# Patient Record
Sex: FEMALE | Employment: UNEMPLOYED | URBAN - METROPOLITAN AREA
[De-identification: names, ages, dates, MRNs, and addresses within clinical notes are randomized per-mention and may not be internally consistent; named-entity substitution may affect disease eponyms.]

---

## 2017-11-16 ENCOUNTER — HOSPITAL ENCOUNTER (EMERGENCY)
Age: 37
Discharge: HOME | End: 2017-11-16
Attending: EMERGENCY MEDICINE
Payer: MEDICAID

## 2017-11-16 VITALS
HEIGHT: 56 IN | OXYGEN SATURATION: 96 % | WEIGHT: 180 LBS | SYSTOLIC BLOOD PRESSURE: 119 MMHG | BODY MASS INDEX: 40.49 KG/M2 | RESPIRATION RATE: 16 BRPM | DIASTOLIC BLOOD PRESSURE: 71 MMHG | TEMPERATURE: 98.8 F | HEART RATE: 88 BPM

## 2017-11-16 DIAGNOSIS — N30.01 ACUTE CYSTITIS WITH HEMATURIA: Primary | ICD-10-CM

## 2017-11-16 LAB
BACTERIA URNS QL MICRO: ABNORMAL /HPF
CASTS URNS QL MICRO: 0 /LPF
CRYSTALS URNS QL MICRO: 0 /LPF
EPI CELLS #/AREA URNS HPF: ABNORMAL /HPF
HCG UR QL: POSITIVE
MUCOUS THREADS URNS QL MICRO: 0 /LPF
OTHER OBSERVATIONS,UCOM: ABNORMAL
RBC #/AREA URNS HPF: ABNORMAL /HPF
WBC URNS QL MICRO: >100 /HPF

## 2017-11-16 PROCEDURE — 81015 MICROSCOPIC EXAM OF URINE: CPT | Performed by: EMERGENCY MEDICINE

## 2017-11-16 PROCEDURE — 99284 EMERGENCY DEPT VISIT MOD MDM: CPT | Performed by: EMERGENCY MEDICINE

## 2017-11-16 PROCEDURE — 81025 URINE PREGNANCY TEST: CPT

## 2017-11-16 PROCEDURE — 81003 URINALYSIS AUTO W/O SCOPE: CPT | Performed by: EMERGENCY MEDICINE

## 2017-11-16 RX ORDER — NITROFURANTOIN 25; 75 MG/1; MG/1
100 CAPSULE ORAL 2 TIMES DAILY
Qty: 14 CAP | Refills: 0 | Status: SHIPPED | OUTPATIENT
Start: 2017-11-16 | End: 2017-11-23

## 2017-11-16 RX ORDER — NITROFURANTOIN 25; 75 MG/1; MG/1
100 CAPSULE ORAL 2 TIMES DAILY
Qty: 14 CAP | Refills: 0 | Status: SHIPPED | OUTPATIENT
Start: 2017-11-16 | End: 2017-11-16

## 2017-11-16 NOTE — ED TRIAGE NOTES
Patient complaining of lower abdominal/ lower back cramping that started earlier today. Patient advises that she is about 5 weeks pregnant with 6th pregnancy and 5 live births. Patient advises having severe pain with urination and noted some blood with urination only.

## 2017-11-16 NOTE — ED NOTES
Pt's  states she needs . Unit secretary calling 3rd floor when interpretyer just went with another pt. . This pt sitting on bed in nad

## 2017-11-17 NOTE — DISCHARGE INSTRUCTIONS
Infección urinaria wilfred el embarazo: Instrucciones de cuidado - [ Urinary Tract Infection in Pregnancy: Care Instructions ]  Instrucciones de cuidado    Flako infección urinaria (UTI, por genny siglas en inglés) es flako infección de la vejiga y Arvie Rakan estructuras urinarias. La mayoría de las UTI ocurren en la vejiga. Con frecuencia, causan dolor o ardor al Vio[place]ta. La UTI es la infección bacteriana más común wilfred el [facility]. Si no se trata, flako UTI puede causar problemas arun flako infección renal o un parto prematuro. La mayoría de las UTI pueden curarse con antibióticos. Forbes médico le recetará un antibiótico que sea seguro wilfred el [facility]. Asegúrese de bety todos genny medicamentos para que la infección no se extienda a los riñones. La atención de seguimiento es flako parte clave de forbes tratamiento y seguridad. Asegúrese de hacer y acudir a todas las citas, y llame a forbes médico si está teniendo problemas. También es flako buena idea saber los resultados de los exámenes y mantener flako lista de los medicamentos que isaac. ¿Cómo puede cuidarse en el hogar? · [address] No deje de tomarlos por el hecho de sentirse mejor. Debe bety todos los antibióticos hasta terminarlos. · Wilfred los próximos yoselyn o [address], maura mayor cantidad de [place] y otros líquidos. [place] ayudará a eliminar las bacterias que están causando la infección. Si tiene [employer], el corazón o el hígado y tiene que [place] líquidos, hable con forbes médico antes de aumentar forbes consumo. · No maura alcohol. · Orine con frecuencia. Trate de vaciar la vejiga cada vez que orine. Prevención de UTI  · Maura abundantes líquidos, los suficientes para que forbes orina sea de color amarillo zoe o transparente arun el agua. [place] la ayuda a orinar con frecuencia, lo que elimina las bacterias de forbes organismo.  Si tiene [employer] [employer], el corazón o el hígado y tiene que [employer] líquidos, [place] con forbes médico antes de aumentar forbes consumo. · Orine en cuanto tenga la necesidad de hacerlo. · Orine inmediatamente después de elyse tenido [employer]. Para las mujeres, romi es la mejor manera de evitar las UTI. · Cuando vaya al baño, límpiese de adelante hacia atrás para evitar que entren bacterias a la vagina o la uretra. ¿Cuándo debe pedir ayuda? Llame a forbes médico ahora mismo o busque atención médica inmediata si:  ? · Síntomas arun fiebre, escalofríos, náuseas o vómitos aparecen por Olimpia Spearing. ? · Tiene un nuevo dolor en la espalda pily debajo de la caja torácica. [place] se llama dolor de flanco.   ? · Tiene leonidas o pus nuevos en la orina. ? · Tiene cualquier problema con genny antibióticos. ?Preste especial atención a los cambios en forbes eduarda y asegúrese de comunicarse con forbes médico si:  ? · No mejora después de 1 día (24 horas). ? · Tiene síntomas nuevos, arun [employer] ¿Dónde puede encontrar más información en inglés? Kaylah Lauren a http://merari-jose.info/. Escriba M982 en la búsqueda para aprender más acerca de \"Infección urinaria wilfred el embarazo: Instrucciones de cuidado - [ Urinary Tract Infection in Pregnancy: Care Instructions ]. \"  Revisado: 16 marzo, 2017  Versión del contenido: 11.4  © 8553-1249 Healthwise, "[employer] [employer])". Las instrucciones de cuidado fueron adaptadas bajo licencia por [employer] (which disclaims liability or warranty for this information). Si usted tiene [place] afección médica o sobre estas instrucciones, siempre pregunte a forbes profesional de eduarda. [employer] niega toda garantía o responsabilidad por forbes uso de esta información.

## 2017-11-17 NOTE — ED NOTES
Through Segundo Lynch, , I have reviewed discharge instructions with the patient. The patient verbalized understanding. Patient left ED via Discharge Method: ambulatory to Home with     Opportunity for questions and clarification provided. Patient given 1 scripts. To continue your aftercare when you leave the hospital, you may receive an automated call from our care team to check in on how you are doing. This is a free service and part of our promise to provide the best care and service to meet your aftercare needs.  If you have questions, or wish to unsubscribe from this service please call [phone]. Thank you for Choosing our [address] [place] [address] Emergency Department.

## 2017-11-17 NOTE — ED PROVIDER NOTES
HPI Comments: Patient approx 8 weeks pregnant and says she has had an US for this in the [facility] clinic. A0. Patient is a 40 y.o. female presenting with frequency. The history is provided by the patient. The history is limited by a language barrier. A  was used. Urinary Frequency    This is a new problem. The current episode started yesterday. The problem occurs every urination. The problem has not changed since onset. The quality of the pain is described as burning. The pain is moderate. There has been no fever. Associated symptoms include frequency, hematuria, urgency, abdominal pain and back pain. Pertinent negatives include no chills, no nausea, no vomiting, no flank pain and no vaginal discharge. She has tried nothing for the symptoms. Past Medical History:   Diagnosis Date    Anemia     Postpartum depression        Past Surgical History:   Procedure Laterality Date     DELIVERY ONLY      HX GYN           History reviewed. No pertinent family history. Social History     Social History    Marital status:      Spouse name: N/A    Number of children: N/A    Years of education: N/A     Occupational History    Not on file. Social History Main Topics    Smoking status: Never Smoker    Smokeless tobacco: Never Used    Alcohol use No    Drug use: No    Sexual activity: Yes     Partners: Male     Other Topics Concern    Not on file     Social History Narrative         ALLERGIES: Review of patient's allergies indicates no known allergies. Review of Systems   Constitutional: Negative for chills and fever. Gastrointestinal: Positive for abdominal pain. Negative for nausea and vomiting. Endocrine: Negative for polydipsia and polyuria. Genitourinary: Positive for dysuria, frequency, hematuria and urgency. Negative for flank pain, vaginal bleeding and vaginal discharge. Musculoskeletal: Positive for back pain.        Vitals:    17 1741 BP: 118/79   Pulse: 91   Resp: 16   Temp: 98.9 °F (37.2 °C)   SpO2: 99%   Weight: 81.6 kg (180 lb)   Height: 4' 8\" (1.422 m)            Physical Exam   Constitutional: She appears well-developed and well-nourished. No distress. HENT:   Mouth/Throat: Oropharynx is clear and moist.   Cardiovascular: Normal rate, regular rhythm and normal heart sounds. Pulmonary/Chest: Effort normal and breath sounds normal.   Abdominal: Soft. She exhibits no distension. There is tenderness (mild suprapubic tenderness). There is no rebound and no guarding. Musculoskeletal: She exhibits no edema. Neurological: She is alert. Nursing note and vitals reviewed. MDM  Number of Diagnoses or Management Options  Diagnosis management comments: Acute cystitis with hematuria. Early pregnancy. Macrobid. [facility] up with [facility] where she is seen for prenatal care in 5-7 days if not improving. Return if fever.        Amount and/or Complexity of Data Reviewed  Clinical lab tests: ordered and reviewed (Results for orders placed or performed during the hospital encounter of 11/16/17  -URINE MICROSCOPIC       Result                                            Value                         Ref Range                       WBC                                               >100                          0 /hpf                          RBC                                               20-50                         0 /hpf                          Epithelial cells                                  3-5                           0 /hpf                          Bacteria                                          4+ (H)                        0 /hpf                          Casts                                             0                             0 /lpf                          Crystals, urine                                   0                             0 /LPF                          Mucus 0                             0 /lpf                          Other observations                                RESULTS VERIFIED MANUALLY                                -HCG URINE, QL. - POC       Result                                            Value                         Ref Range                       Pregnancy test,urine (POC)                        POSITIVE (A)                  NEG                        )      ED Course       Procedures

## 2018-05-30 PROBLEM — Z34.83 PRENATAL CARE, SUBSEQUENT PREGNANCY IN THIRD TRIMESTER: Status: ACTIVE | Noted: 2018-05-30

## 2018-06-04 NOTE — PROGRESS NOTES
confirmed. Dave Bean to call patient with instructions to be NPO after midnight, arrive at 0630 , come to C entrance and check in on 4th floor.

## 2018-06-05 ENCOUNTER — ANESTHESIA (OUTPATIENT)
Dept: LABOR AND DELIVERY | Age: 38
DRG: 560 | End: 2018-06-05
Payer: MEDICAID

## 2018-06-05 ENCOUNTER — ANESTHESIA EVENT (OUTPATIENT)
Dept: LABOR AND DELIVERY | Age: 38
DRG: 560 | End: 2018-06-05
Payer: MEDICAID

## 2018-06-05 ENCOUNTER — HOSPITAL ENCOUNTER (INPATIENT)
Age: 38
LOS: 2 days | Discharge: HOME | DRG: 560 | End: 2018-06-07
Attending: OBSTETRICS & GYNECOLOGY | Admitting: OBSTETRICS & GYNECOLOGY
Payer: MEDICAID

## 2018-06-05 PROBLEM — O42.90 SPROM (PROLONGED SPONTANEOUS RUPTURE OF MEMBRANES): Status: ACTIVE | Noted: 2018-06-05

## 2018-06-05 PROBLEM — Z34.83 PRENATAL CARE, SUBSEQUENT PREGNANCY IN THIRD TRIMESTER: Status: RESOLVED | Noted: 2018-05-30 | Resolved: 2018-06-05

## 2018-06-05 PROBLEM — O42.90 SPROM (PROLONGED SPONTANEOUS RUPTURE OF MEMBRANES): Status: RESOLVED | Noted: 2018-06-05 | Resolved: 2018-06-05

## 2018-06-05 LAB
ABO + RH BLD: NORMAL
BASE DEFICIT BLDCOA-SCNC: 1.4 MMOL/L (ref 0–2)
BASE DEFICIT BLDCOV-SCNC: 2.7 MMOL/L (ref 1.9–7.7)
BDY SITE: ABNORMAL
BDY SITE: NORMAL
BLOOD GROUP ANTIBODIES SERPL: NORMAL
ERYTHROCYTE [DISTWIDTH] IN BLOOD BY AUTOMATED COUNT: 19.1 % (ref 11.9–14.6)
HCO3 BLDCOA-SCNC: 27 MMOL/L (ref 22–26)
HCO3 BLDV-SCNC: 21 MMOL/L
HCT VFR BLD AUTO: 37.7 % (ref 35.8–46.3)
HGB BLD-MCNC: 12.7 G/DL (ref 11.7–15.4)
MCH RBC QN AUTO: 29.2 PG (ref 26.1–32.9)
MCHC RBC AUTO-ENTMCNC: 33.7 G/DL (ref 31.4–35)
MCV RBC AUTO: 86.7 FL (ref 79.6–97.8)
PCO2 BLDCOA: 62 MMHG (ref 33–49)
PCO2 BLDCOV: 34 MMHG (ref 14.1–43.3)
PH BLDCOA: 7.26 [PH] (ref 7.21–7.31)
PH BLDCOV: 7.41 [PH] (ref 7.2–7.44)
PLATELET # BLD AUTO: 234 K/UL (ref 150–450)
PMV BLD AUTO: 11.3 FL (ref 10.8–14.1)
PO2 BLDCOA: 20 MMHG (ref 9–19)
PO2 BLDV: 50 MMHG (ref 30.4–57.2)
RBC # BLD AUTO: 4.35 M/UL (ref 4.05–5.25)
SERVICE CMNT-IMP: ABNORMAL
SERVICE CMNT-IMP: NORMAL
SPECIMEN EXP DATE BLD: NORMAL
WBC # BLD AUTO: 6.4 K/UL (ref 4.3–11.1)

## 2018-06-05 PROCEDURE — 74011000250 HC RX REV CODE- 250

## 2018-06-05 PROCEDURE — 74011250636 HC RX REV CODE- 250/636

## 2018-06-05 PROCEDURE — 82803 BLOOD GASES ANY COMBINATION: CPT

## 2018-06-05 PROCEDURE — 65270000029 HC RM PRIVATE

## 2018-06-05 PROCEDURE — 77030005518 HC CATH URETH FOL 2W BARD -B

## 2018-06-05 PROCEDURE — 3E033VJ INTRODUCTION OF OTHER HORMONE INTO PERIPHERAL VEIN, PERCUTANEOUS APPROACH: ICD-10-PCS | Performed by: OBSTETRICS & GYNECOLOGY

## 2018-06-05 PROCEDURE — 77030014125 HC TY EPDRL BBMI -B: Performed by: ANESTHESIOLOGY

## 2018-06-05 PROCEDURE — 85027 COMPLETE CBC AUTOMATED: CPT | Performed by: OBSTETRICS & GYNECOLOGY

## 2018-06-05 PROCEDURE — 4A1HXCZ MONITORING OF PRODUCTS OF CONCEPTION, CARDIAC RATE, EXTERNAL APPROACH: ICD-10-PCS | Performed by: OBSTETRICS & GYNECOLOGY

## 2018-06-05 PROCEDURE — 74011250636 HC RX REV CODE- 250/636: Performed by: OBSTETRICS & GYNECOLOGY

## 2018-06-05 PROCEDURE — 74011258636 HC RX REV CODE- 258/636: Performed by: OBSTETRICS & GYNECOLOGY

## 2018-06-05 PROCEDURE — 86900 BLOOD TYPING SEROLOGIC ABO: CPT | Performed by: OBSTETRICS & GYNECOLOGY

## 2018-06-05 PROCEDURE — 36415 COLL VENOUS BLD VENIPUNCTURE: CPT | Performed by: OBSTETRICS & GYNECOLOGY

## 2018-06-05 PROCEDURE — A4300 CATH IMPL VASC ACCESS PORTAL: HCPCS | Performed by: ANESTHESIOLOGY

## 2018-06-05 PROCEDURE — 77030011943

## 2018-06-05 PROCEDURE — 74011250637 HC RX REV CODE- 250/637: Performed by: OBSTETRICS & GYNECOLOGY

## 2018-06-05 PROCEDURE — 77030020254 HC SOL INJ D5LR LACTATED RINGER

## 2018-06-05 RX ORDER — FENTANYL CITRATE 50 UG/ML
INJECTION, SOLUTION INTRAMUSCULAR; INTRAVENOUS
Status: COMPLETED
Start: 2018-06-05 | End: 2018-06-05

## 2018-06-05 RX ORDER — OXYTOCIN/RINGER'S LACTATE 30/500 ML
250 PLASTIC BAG, INJECTION (ML) INTRAVENOUS
Status: COMPLETED | OUTPATIENT
Start: 2018-06-05 | End: 2018-06-05

## 2018-06-05 RX ORDER — PRENATAL VIT 96/IRON FUM/FOLIC 27MG-0.8MG
1 TABLET ORAL DAILY
Status: DISCONTINUED | OUTPATIENT
Start: 2018-06-06 | End: 2018-06-07 | Stop reason: HOSPADM

## 2018-06-05 RX ORDER — ROPIVACAINE HYDROCHLORIDE 2 MG/ML
INJECTION, SOLUTION EPIDURAL; INFILTRATION; PERINEURAL
Status: DISCONTINUED | OUTPATIENT
Start: 2018-06-05 | End: 2018-06-05 | Stop reason: HOSPADM

## 2018-06-05 RX ORDER — LIDOCAINE HYDROCHLORIDE 20 MG/ML
JELLY TOPICAL
Status: DISCONTINUED | OUTPATIENT
Start: 2018-06-05 | End: 2018-06-06 | Stop reason: HOSPADM

## 2018-06-05 RX ORDER — ACETAMINOPHEN 500 MG
1000 TABLET ORAL ONCE
Status: COMPLETED | OUTPATIENT
Start: 2018-06-05 | End: 2018-06-05

## 2018-06-05 RX ORDER — OXYTOCIN/RINGER'S LACTATE 30/500 ML
.5-2 PLASTIC BAG, INJECTION (ML) INTRAVENOUS
Status: DISCONTINUED | OUTPATIENT
Start: 2018-06-05 | End: 2018-06-06 | Stop reason: HOSPADM

## 2018-06-05 RX ORDER — DIPHENHYDRAMINE HCL 25 MG
50 CAPSULE ORAL ONCE
Status: COMPLETED | OUTPATIENT
Start: 2018-06-05 | End: 2018-06-05

## 2018-06-05 RX ORDER — SODIUM CHLORIDE 0.9 % (FLUSH) 0.9 %
5-10 SYRINGE (ML) INJECTION EVERY 8 HOURS
Status: DISCONTINUED | OUTPATIENT
Start: 2018-06-05 | End: 2018-06-06

## 2018-06-05 RX ORDER — IBUPROFEN 800 MG/1
800 TABLET ORAL
Status: DISCONTINUED | OUTPATIENT
Start: 2018-06-05 | End: 2018-06-07 | Stop reason: HOSPADM

## 2018-06-05 RX ORDER — ONDANSETRON 4 MG/1
4 TABLET, ORALLY DISINTEGRATING ORAL
Status: DISCONTINUED | OUTPATIENT
Start: 2018-06-05 | End: 2018-06-07 | Stop reason: HOSPADM

## 2018-06-05 RX ORDER — OXYTOCIN/0.9 % SODIUM CHLORIDE 15/250 ML
250 PLASTIC BAG, INJECTION (ML) INTRAVENOUS ONCE
Status: ACTIVE | OUTPATIENT
Start: 2018-06-05 | End: 2018-06-05

## 2018-06-05 RX ORDER — LIDOCAINE HYDROCHLORIDE AND EPINEPHRINE 15; 5 MG/ML; UG/ML
INJECTION, SOLUTION EPIDURAL AS NEEDED
Status: DISCONTINUED | OUTPATIENT
Start: 2018-06-05 | End: 2018-06-05 | Stop reason: HOSPADM

## 2018-06-05 RX ORDER — METHYLERGONOVINE MALEATE 0.2 MG/1
200 TABLET ORAL EVERY 4 HOURS
Status: COMPLETED | OUTPATIENT
Start: 2018-06-06 | End: 2018-06-06

## 2018-06-05 RX ORDER — LANOLIN ALCOHOL/MO/W.PET/CERES
1 CREAM (GRAM) TOPICAL
Status: DISCONTINUED | OUTPATIENT
Start: 2018-06-06 | End: 2018-06-07 | Stop reason: HOSPADM

## 2018-06-05 RX ORDER — LIDOCAINE HYDROCHLORIDE 10 MG/ML
1 INJECTION INFILTRATION; PERINEURAL
Status: DISCONTINUED | OUTPATIENT
Start: 2018-06-05 | End: 2018-06-06 | Stop reason: HOSPADM

## 2018-06-05 RX ORDER — BUTORPHANOL TARTRATE 1 MG/ML
1 INJECTION INTRAMUSCULAR; INTRAVENOUS
Status: DISCONTINUED | OUTPATIENT
Start: 2018-06-05 | End: 2018-06-06 | Stop reason: HOSPADM

## 2018-06-05 RX ORDER — BUPIVACAINE HYDROCHLORIDE 2.5 MG/ML
INJECTION, SOLUTION EPIDURAL; INFILTRATION; INTRACAUDAL AS NEEDED
Status: DISCONTINUED | OUTPATIENT
Start: 2018-06-05 | End: 2018-06-05 | Stop reason: HOSPADM

## 2018-06-05 RX ORDER — SODIUM CHLORIDE 0.9 % (FLUSH) 0.9 %
5-10 SYRINGE (ML) INJECTION AS NEEDED
Status: DISCONTINUED | OUTPATIENT
Start: 2018-06-05 | End: 2018-06-06

## 2018-06-05 RX ORDER — DIPHENHYDRAMINE HCL 25 MG
25 CAPSULE ORAL
Status: DISCONTINUED | OUTPATIENT
Start: 2018-06-05 | End: 2018-06-07 | Stop reason: HOSPADM

## 2018-06-05 RX ORDER — KETOROLAC TROMETHAMINE 30 MG/ML
30 INJECTION, SOLUTION INTRAMUSCULAR; INTRAVENOUS
Status: COMPLETED | OUTPATIENT
Start: 2018-06-05 | End: 2018-06-05

## 2018-06-05 RX ORDER — SODIUM CHLORIDE, SODIUM LACTATE, POTASSIUM CHLORIDE, CALCIUM CHLORIDE 600; 310; 30; 20 MG/100ML; MG/100ML; MG/100ML; MG/100ML
INJECTION, SOLUTION INTRAVENOUS
Status: DISCONTINUED | OUTPATIENT
Start: 2018-06-05 | End: 2018-06-05 | Stop reason: HOSPADM

## 2018-06-05 RX ORDER — METHYLERGONOVINE MALEATE 0.2 MG/ML
INJECTION INTRAVENOUS
Status: DISCONTINUED
Start: 2018-06-05 | End: 2018-06-06

## 2018-06-05 RX ORDER — DEXTROSE, SODIUM CHLORIDE, SODIUM LACTATE, POTASSIUM CHLORIDE, AND CALCIUM CHLORIDE 5; .6; .31; .03; .02 G/100ML; G/100ML; G/100ML; G/100ML; G/100ML
125 INJECTION, SOLUTION INTRAVENOUS CONTINUOUS
Status: DISCONTINUED | OUTPATIENT
Start: 2018-06-05 | End: 2018-06-06

## 2018-06-05 RX ORDER — SIMETHICONE 80 MG
80 TABLET,CHEWABLE ORAL
Status: DISCONTINUED | OUTPATIENT
Start: 2018-06-05 | End: 2018-06-07 | Stop reason: HOSPADM

## 2018-06-05 RX ORDER — METHYLERGONOVINE MALEATE 0.2 MG/ML
0.2 INJECTION INTRAVENOUS ONCE
Status: DISCONTINUED | OUTPATIENT
Start: 2018-06-05 | End: 2018-06-06

## 2018-06-05 RX ORDER — FENTANYL CITRATE 50 UG/ML
INJECTION, SOLUTION INTRAMUSCULAR; INTRAVENOUS AS NEEDED
Status: DISCONTINUED | OUTPATIENT
Start: 2018-06-05 | End: 2018-06-05 | Stop reason: HOSPADM

## 2018-06-05 RX ORDER — MINERAL OIL
120 OIL (ML) ORAL
Status: COMPLETED | OUTPATIENT
Start: 2018-06-05 | End: 2018-06-05

## 2018-06-05 RX ORDER — DOCUSATE SODIUM 100 MG/1
100 CAPSULE, LIQUID FILLED ORAL 2 TIMES DAILY
Status: DISCONTINUED | OUTPATIENT
Start: 2018-06-06 | End: 2018-06-07 | Stop reason: HOSPADM

## 2018-06-05 RX ORDER — ROPIVACAINE HYDROCHLORIDE 2 MG/ML
INJECTION, SOLUTION EPIDURAL; INFILTRATION; PERINEURAL AS NEEDED
Status: DISCONTINUED | OUTPATIENT
Start: 2018-06-05 | End: 2018-06-05 | Stop reason: HOSPADM

## 2018-06-05 RX ORDER — LIDOCAINE HYDROCHLORIDE 20 MG/ML
INJECTION, SOLUTION EPIDURAL; INFILTRATION; INTRACAUDAL; PERINEURAL AS NEEDED
Status: DISCONTINUED | OUTPATIENT
Start: 2018-06-05 | End: 2018-06-05 | Stop reason: HOSPADM

## 2018-06-05 RX ORDER — OXYTOCIN/0.9 % SODIUM CHLORIDE 15/250 ML
250 PLASTIC BAG, INJECTION (ML) INTRAVENOUS ONCE
Status: ACTIVE | OUTPATIENT
Start: 2018-06-06 | End: 2018-06-06

## 2018-06-05 RX ADMIN — ROPIVACAINE HYDROCHLORIDE 5 ML: 2 INJECTION, SOLUTION EPIDURAL; INFILTRATION; PERINEURAL at 13:45

## 2018-06-05 RX ADMIN — DIPHENHYDRAMINE HYDROCHLORIDE 50 MG: 25 CAPSULE ORAL at 20:36

## 2018-06-05 RX ADMIN — ACETAMINOPHEN 1000 MG: 500 TABLET, FILM COATED ORAL at 19:43

## 2018-06-05 RX ADMIN — SODIUM CHLORIDE, POTASSIUM CHLORIDE, SODIUM LACTATE AND CALCIUM CHLORIDE 500 ML: 600; 310; 30; 20 INJECTION, SOLUTION INTRAVENOUS at 08:20

## 2018-06-05 RX ADMIN — LIDOCAINE HYDROCHLORIDE AND EPINEPHRINE 4 ML: 15; 5 INJECTION, SOLUTION EPIDURAL at 11:02

## 2018-06-05 RX ADMIN — LIDOCAINE HYDROCHLORIDE 5 ML: 20 INJECTION, SOLUTION EPIDURAL; INFILTRATION; INTRACAUDAL; PERINEURAL at 19:12

## 2018-06-05 RX ADMIN — LIDOCAINE HYDROCHLORIDE AND EPINEPHRINE 5 ML: 15; 5 INJECTION, SOLUTION EPIDURAL at 10:14

## 2018-06-05 RX ADMIN — METHYLERGONOVINE MALEATE 0.2 MG: 0.2 INJECTION INTRAVENOUS at 21:35

## 2018-06-05 RX ADMIN — OXYTOCIN 2 MILLI-UNITS/MIN: 10 INJECTION, SOLUTION INTRAMUSCULAR; INTRAVENOUS at 08:05

## 2018-06-05 RX ADMIN — BUPIVACAINE HYDROCHLORIDE 7 ML: 2.5 INJECTION, SOLUTION EPIDURAL; INFILTRATION; INTRACAUDAL at 17:25

## 2018-06-05 RX ADMIN — KETOROLAC TROMETHAMINE 30 MG: 30 INJECTION, SOLUTION INTRAMUSCULAR at 23:19

## 2018-06-05 RX ADMIN — METHYLERGONOVINE MALEATE 0.2 MG: 0.2 INJECTION INTRAMUSCULAR; INTRAVENOUS at 21:35

## 2018-06-05 RX ADMIN — ROPIVACAINE HYDROCHLORIDE 5 ML: 2 INJECTION, SOLUTION EPIDURAL; INFILTRATION; PERINEURAL at 12:05

## 2018-06-05 RX ADMIN — SODIUM CHLORIDE, SODIUM LACTATE, POTASSIUM CHLORIDE, CALCIUM CHLORIDE, AND DEXTROSE MONOHYDRATE 125 ML/HR: 600; 310; 30; 20; 5 INJECTION, SOLUTION INTRAVENOUS at 07:50

## 2018-06-05 RX ADMIN — FENTANYL CITRATE 100 MCG: 50 INJECTION, SOLUTION INTRAMUSCULAR; INTRAVENOUS at 14:29

## 2018-06-05 RX ADMIN — BUPIVACAINE HYDROCHLORIDE 7 ML: 2.5 INJECTION, SOLUTION EPIDURAL; INFILTRATION; INTRACAUDAL at 15:10

## 2018-06-05 RX ADMIN — MINERAL OIL 120 ML: 471.95 OIL ORAL at 21:20

## 2018-06-05 RX ADMIN — ROPIVACAINE HYDROCHLORIDE 9 ML/HR: 2 INJECTION, SOLUTION EPIDURAL; INFILTRATION; PERINEURAL at 10:24

## 2018-06-05 RX ADMIN — SODIUM CHLORIDE, SODIUM LACTATE, POTASSIUM CHLORIDE, CALCIUM CHLORIDE: 600; 310; 30; 20 INJECTION, SOLUTION INTRAVENOUS at 10:03

## 2018-06-05 RX ADMIN — OXYTOCIN 10 MILLI-UNITS/MIN: 10 INJECTION, SOLUTION INTRAMUSCULAR; INTRAVENOUS at 10:00

## 2018-06-05 RX ADMIN — SODIUM CHLORIDE, SODIUM LACTATE, POTASSIUM CHLORIDE, CALCIUM CHLORIDE, AND DEXTROSE MONOHYDRATE 125 ML/HR: 600; 310; 30; 20; 5 INJECTION, SOLUTION INTRAVENOUS at 15:16

## 2018-06-05 RX ADMIN — LIDOCAINE HYDROCHLORIDE 4 ML: 20 INJECTION, SOLUTION EPIDURAL; INFILTRATION; INTRACAUDAL; PERINEURAL at 15:20

## 2018-06-05 RX ADMIN — LIDOCAINE HYDROCHLORIDE 5 ML: 20 INJECTION, SOLUTION EPIDURAL; INFILTRATION; INTRACAUDAL; PERINEURAL at 17:50

## 2018-06-05 RX ADMIN — OXYTOCIN 15000 MILLI-UNITS/HR: 10 INJECTION, SOLUTION INTRAMUSCULAR; INTRAVENOUS at 22:30

## 2018-06-05 RX ADMIN — LIDOCAINE HYDROCHLORIDE 6 ML: 20 INJECTION, SOLUTION EPIDURAL; INFILTRATION; INTRACAUDAL; PERINEURAL at 10:19

## 2018-06-05 NOTE — LACTATION NOTE
Discussed plan of care with patient. Discussed pt's choice of infant feeding method. Feeding options explored, including the importance of exclusive breastfeeding. Pt informed of our breastfeeding support system from from nursing staff and lactation staff during inpatient stay and following discharge. Questions and concerns addressed at this time. Pt confirms breastfeeding   is her decision at this time. Keyon Casey

## 2018-06-05 NOTE — PROGRESS NOTES
Dr Wang Brewster at bedside , SVE 8cm , Repositioned onto right side on peanut.    Pt continues to have pain in left lower quadrant of abd past use of epidural bolus option will notify CRNA

## 2018-06-05 NOTE — PROGRESS NOTES
06/05/18 1615   Cervical Exam   Dilation (cm) 8   Eff 80 %   Station -2   Vaginal exam done by? Padmini Edwards RN    Presentation Cephalic   Pt positioned onto right side on peanut.

## 2018-06-05 NOTE — PROGRESS NOTES
Dr Matt Landry notified of pts dilation , and variable decelerations, instructed to stop Pitocin and he will be here in 30 min

## 2018-06-05 NOTE — ANESTHESIA PREPROCEDURE EVALUATION
Anesthetic History   No history of anesthetic complications            Review of Systems / Medical History  Patient summary reviewed, nursing notes reviewed and pertinent labs reviewed    Pulmonary  Within defined limits                 Neuro/Psych         Psychiatric history     Cardiovascular  Within defined limits                Exercise tolerance: >4 METS     GI/Hepatic/Renal  Within defined limits              Endo/Other        Morbid obesity     Other Findings   Comments:            Physical Exam    Airway  Mallampati: II           Cardiovascular  Regular rate and rhythm,  S1 and S2 normal,  no murmur, click, rub, or gallop             Dental  No notable dental hx       Pulmonary  Breath sounds clear to auscultation               Abdominal         Other Findings            Anesthetic Plan    ASA: 3  Anesthesia type: epidural            Anesthetic plan and risks discussed with: Patient and Spouse      , Ml William, present and utilized

## 2018-06-05 NOTE — H&P
History & Physical    Name: Cale Preciado MRN: [id number]  SSN: [id number]    YOB: 1980  Age: 40 y.o. Sex: female      Subjective:     Estimated Date of Delivery: 18  OB History    Para Term  AB Living   6 5 5   5   SAB TAB Ectopic Molar Multiple Live Births       0 5      # Outcome Date GA Lbr Mushtaq/2nd Weight Sex Delivery Anes PTL Lv   6 Current            5 Term 16 40w0d  8 lb 13.3 oz (4.005 kg) F Vag-Spont EPIDURAL AN N HA   4 Term      Vag-Spont   HA   3 Term      Vag-Spont   HA   2 Term      Vag-Spont   HA   1 Term      Vag-Spont   HA          Ms. Shanice Bosch is admitted with pregnancy at 39w1d for induction of labor due to spontaneous rupture of membranes. Prenatal course was normal.  Please see prenatal records for details. Past Medical History:   Diagnosis Date    Anemia     Postpartum depression      Past Surgical History:   Procedure Laterality Date     DELIVERY ONLY      HX GYN       Social History     Occupational History    Not on file. Social History Main Topics    Smoking status: Never Smoker    Smokeless tobacco: Never Used    Alcohol use No    Drug use: No    Sexual activity: Yes     Partners: Male     No family history on file. No Known Allergies  Prior to Admission medications    Medication Sig Start Date End Date Taking? Authorizing Provider   PNV No12-Iron-FA-DSS-OM-3 29 mg iron-1 mg -50 mg CPKD Take  by mouth. Yes Historical Provider   ferrous sulfate (IRON) 325 mg (65 mg iron) EC tablet Take 1 Tab by mouth two (2) times a day. 3/27/18  Yes Autumn Self MD        Review of Systems: Pertinent items are noted in the History of Present Illness. Objective:     Vitals:  Vitals:    18 0652 18 0715   BP: 137/76    Pulse: 80    Resp:  16   Temp:  97.9 °F (36.6 °C)        Physical Exam:  Patient without distress.   Heart: Regular rate and rhythm  Lung: clear to auscultation throughout lung fields, no wheezes, no rales, no rhonchi and normal respiratory effort  Back: costovertebral angle tenderness absent  Abdomen: soft, nontender  Fundus: soft and non tender  Perineum: blood absent, amniotic fluid present  Cervical Exam: 2 cm dilated    Lower Extremities:  - Edema 2+   - No evidence of DVT seen on physical exam.  Membranes:  Spontaneous Rupture of Membranes; Amniotic Fluid: clear fluid        Prenatal Labs:   Lab Results   Component Value Date/Time    ABO/Rh(D) O POSITIVE 08/28/2016 07:35 AM    Rubella, External Immune (2.25) 12/04/2017    HBsAg, External Negative 12/04/2017    HIV, External Negative 12/04/2017    RPR, External Negative 12/04/2017    Gonorrhea, External Negative 12/04/2017    Chlamydia, External Negative 12/04/2017    ABO,Rh O Positive 12/04/2017    GrBStrep, External Negative 05/09/2018       Impression/Plan:     Principal Problem:    SPROM (prolonged spontaneous rupture of membranes) (6/5/2018)    Active Problems:    39 weeks gestation of pregnancy (8/28/2016)         Plan: Admit for induction of labor. Group B Strep negative.     Signed By:  Tien Powell MD     June 5, 2018

## 2018-06-05 NOTE — PROGRESS NOTES
06/05/18 1445   Urinary Catheter 06/05/18 Chaudhry   Placement Date/Time: 06/05/18 1445   Inserted By: Tangela Vargas RN   Type: Chaudhry  Catheter Size: 16 FR  Balloon Size: 10 ml  Insertion Procedure Practices: Order written;Pre-connected closed drainage system;Properly inflated balloon;Securement device used. ..    Indications for Use Acute urinary retention/bladder outlet obstruction   Status Draining;Patent   Site Condition No abnormalities   Chaudhry Catheter Care Completed Yes   Drainage Tube Clipped to Bed Yes   Catheter Secured to Thigh Yes   Tamper Seal Intact Yes   Bag Below Bladder/Not on Floor Yes   Lack of Dependent Loop in Tubing Yes   Drainage Bag Less Than Half Full Yes   Sterile Solution Used for  Irrigation N/A   Pt tolerated well,

## 2018-06-05 NOTE — PROGRESS NOTES
Labor Progress Note  Patient seen, fetal heart rate and contraction pattern evaluated, patient examined. Pt c/o pain with contractions. Appears comfortable, quiet. Physical Exam:  Cervical Exam:  4 cm dilated    60% effaced    -2 station    Presenting Part: cephalic  Membranes:  Intact    Fetal Heart Rate: Reactive    Vertex confirmed by ultrasound. Bladder emptied. arom for copious clear fluid. Assessment/Plan:  Reassuring fetal status, Labor  Progressing normally  Head feels asynclitic by exam. Vertex again confirmed by ultrasound. After arom 6/70/-2  Epidural re-dosed.  Pitocin at 14

## 2018-06-05 NOTE — PROGRESS NOTES
Explained to pt via  that epidural would need to be replaced pt verbalized understanding and consent.   Epidural pump stopped per Dr Hui Certain verbal order  IVB of LR started to get pt ready for epidural replacement

## 2018-06-05 NOTE — IP AVS SNAPSHOT
[id number] 
[phone] Patient: [facility] MRN: [id number] DYD: About your hospitalization You were admitted on:  2018 You last received care in the:  [address] [facility] You were discharged on:  2018 Why you were hospitalized Your primary diagnosis was:   (Vaginal Birth After ) Your diagnoses also included:  39 Weeks Gestation Of Pregnancy, Sprom (Prolonged Spontaneous Rupture Of Membranes), Previous  Section Complicating Pregnancy Follow-up Information Follow up With Details Comments Contact Info Gatito Amin MD On 2018 at 11:30 am [address] [place] [address]  [address] 
[phone] Your Scheduled Appointments  11:30 AM EDT Global Post Op with Gatito Amin MD  
[facility]) [address]  [address]  
[phone] Discharge Orders None A check lynne indicates which time of day the medication should be taken. My Medications START taking these medications Instructions Each Dose to Equal  
 Morning Noon Evening Bedtime  
 ibuprofen 800 mg tablet Commonly known as:  MOTRIN Your last dose was: Your next dose is: Take 1 Tab by mouth every six (6) hours as needed. 800 mg CONTINUE taking these medications Instructions Each Dose to Equal  
 Morning Noon Evening Bedtime  
 ferrous sulfate 325 mg (65 mg iron) EC tablet Commonly known as:  IRON Your last dose was: Your next dose is: Take 1 Tab by mouth two (2) times a day. 325 mg  
    
   
   
   
  
 PNV No12-Iron-FA-DSS-OM-3 29 mg iron-1 mg -50 mg Cpkd Your last dose was: Your next dose is: Take  by mouth. Where to Get Your Medications These medications were sent to [address], [address] AT [facility] [address], [address] Phone:  [phone]  
  ibuprofen 800 mg tablet Discharge Instructions Vaginal Childbirth: What To Expect At Home Your Recovery: Your body will slowly heal in the next few weeks. It is easy to get too tired and overwhelmed during the first weeks after your baby is born. Changes in your hormones can shift your mood without warning. You may find it hard to meet the extra demands on your energy and time. Take it easy on yourself. Follow-up care is a key part of your treatment and safety. Be sure to make and go to all appointments, and call your doctor if you are having problems. It's also a good idea to know your test results and keep a list of the medicines you take. How can you care for yourself at home? Vaginal bleeding and cramps · After delivery, you will have a bloody discharge from the vagina. This will turn pink within a week and then white or yellow after about 10 days. It may last for 2 to 4 weeks or longer, until the uterus has healed. Use pads instead of tampons until you stop bleeding. · Do not worry if you pass some blood clots, as long as they are smaller than a golf ball. If you have a tear or stitches in your vaginal area, change the pad at least every 4 hours to prevent soreness and infection. · You may have cramps for the first few days after childbirth. These are normal and occur as the uterus shrinks to normal size. Take an over-the-counter pain medicine, such as acetaminophen (Tylenol), ibuprofen (Advil, Motrin), or naproxen (Aleve), for cramps. Read and follow all instructions on the label. Do not take aspirin, because it can cause more bleeding.   Do not take acetaminophen (Tylenol) and other acetaminophen containing medications (i.e. Percocet) at the same time. Breast fullness · Your breasts may overfill (engorge) in the first few days after delivery. To help milk flow and to relieve pain, warm your breasts in the shower or by using warm, moist towels before nursing. · If you are not nursing, do not put warmth on your breasts or touch your breasts. Wear a tight bra or sports bra and use ice until the fullness goes away. This usually takes 2 to 3 days. · Put ice or a cold pack on your breast after nursing to reduce swelling and pain. Put a thin cloth between the ice and your skin. Activity · Eat a balanced diet. Do not try to lose weight by cutting calories. Keep taking your prenatal vitamins, or take a multivitamin. · Get as much rest as you can. Try to take naps when your baby sleeps during the day. · Get some exercise every day. But do not do any heavy exercise until your doctor says it is okay. · Wait until you are healed (about 4 to 6 weeks) before you have sexual intercourse. Your doctor will tell you when it is okay to have sex. · Talk to your doctor about birth control. You can get pregnant even before your period returns. Also, you can get pregnant while you are breast-feeding. Mental Health · Many women get the \"baby blues\" during the first few days after childbirth. You may lose sleep, feel irritable, and cry easily. You may feel happy one minute and sad the next. Hormone changes are one cause of these emotional changes. Also, the demands of a new baby, along with visits from relatives or other family needs, add to a mother's stress. The \"baby blues\" often peak around the fourth day. Then they ease up in less than 2 weeks. · If your moodiness or anxiety lasts for more than 2 weeks, or if you feel like life is not worth living, you may have postpartum depression. This is different for each mother.  Some mothers with serious depression may worry intensely about their infant's well-being. Others may feel distant from their child. Some mothers might even feel that they might harm their baby. A mother may have signs of paranoia, wondering if someone is watching her. · With all the changes in your life, you may not know if you are depressed. Pregnancy sometimes causes changes in how you feel that are similar to the symptoms of depression. · Symptoms of depression include: · Feeling sad or hopeless and losing interest in daily activities. These are the most common symptoms of depression. · Sleeping too much or not enough. · Feeling tired. You may feel as if you have no energy. · Eating too much or too little. · POSTPARTUM SUPPORT INTERNATIONAL (PSI) offers a Warm line; Chat with the Expert phone sessions; Information and Articles about Pregnancy and Postpartum Mood Disorders; Comprehensive List of Free Support Groups; Knowledgeable local coordinators who will offer support, information, and resources; Guide to Resources on JamLegend; Calendar of events in the  mood disorders community; Latest News and Research; and [facility] [address] for [employer]. Remember - You are not alone; You are not to blame; With help, you will be well. 3-349-126-PPD(2435). WWW. POSTPARTUM. NET · Writing or talking about death, such as writing suicide notes or talking about guns, knives, or pills. Keep the numbers for these national suicide hotlines: 0-138-976-TALK (7-834.709.6270) and 2-259-LVYRJJR (4-349.244.6060). If you or someone you know talks about suicide or feeling hopeless, get help right away. Constipation and Hemorrhoids · Drink plenty of fluids, enough so that your urine is light yellow or clear like water. If you have kidney, heart, or liver disease and have to limit fluids, talk with your doctor before you increase the amount of fluids you drink. · Eat plenty of fiber each day.  Have a bran muffin or bran cereal for breakfast, and try eating a piece of fruit for a mid-afternoon snack. · For painful, itchy hemorrhoids, put ice or a cold pack on the area several times a day for 10 minutes at a time. Follow this by putting a warm compress on the area for another 10 to 20 minutes or by sitting in a shallow, warm bath. When should you call for help? Call 911 anytime you think you may need emergency care. For example, call if: 
· You are thinking of hurting yourself, your baby, or anyone else. · You passed out (lost consciousness). · You have symptoms of a blood clot in your lung (called a pulmonary embolism). These may include:   
· Sudden chest pain. · Trouble breathing. · Coughing up blood. Call your doctor now or seek immediate medical care if: 
· You have severe vaginal bleeding. · You are soaking through a pad each hour for 2 or more hours. · Your vaginal bleeding seems to be getting heavier or is still bright red 4 days after delivery. · You are dizzy or lightheaded, or you feel like you may faint. · You are vomiting or cannot keep fluids down. · You have a fever. · You have new or more belly pain. · You pass tissue (not just blood). · Your vaginal discharge smells bad. · Your belly feels tender or full and hard. · Your breasts are continuously painful or red. · You feel sad, anxious, or hopeless for more than a few days. · You have sudden, severe pain in your belly. · You have symptoms of a blood clot in your leg (called a deep vein thrombosis),  
       such as: 
· Pain in your calf, back of the knee, thigh, or groin. · Redness and swelling in your leg or groin. · You have symptoms of preeclampsia, such as: 
· Sudden swelling of your face, hands, or feet. · New vision problems (such as dimness or blurring). · A severe headache. · Your blood pressure is higher than it should be or rises suddenly. · You have new nausea or vomiting. Watch closely for changes in your health, and be sure to contact your doctor if you have any problems. Parto vaginal: Instrucciones de cuidado - [ Vaginal Childbirth: Care Instructions ] Instrucciones de cuidado Forbes cuerpo sanará poco a poco wilfred las Revstr. Wilfred las primeras semanas después del nacimiento de forbes bebé, es fácil cansarse mucho o sentirse [place]. Los Revstr hormonas pueden afectar forbes estado de ánimo sin previo aviso. Podría descubrir que es difícil satisfacer las exigencias adicionales de energía y [place]. No sharfia demasiados esfuerzos. La atención de seguimiento es flako parte clave de tu tratamiento y seguridad. Asegúrate de hacer y acudir a todas las citas, y llama a tu médico si estás teniendo problemas. También es flako buena idea saber los resultados de los exámenes y mantener flako lista de los medicamentos que giuliano. Cómo puede cuidarse en el hogar? · Sangrado vaginal y cólicos (retortijones) ¨ Después del parto, tendrá flujo sanguinolento (con leonidas) de la vagina. Yin adquirirá un color rosáceo en el plazo de flako semana y luego blancuzco o amarillento después de 10 lexy. El flujo podría durar [place] 2 y 3 [facility] o más, hasta que el útero haya sanado. Use toallas sanitarias en vez de tampones hasta que deje de sangrar. ¨ No se preocupe si elimina algunos coágulos de [place], siempre y cuando nancy de tamaño inferior al de flako pelota de golf. Si tiene un desgarro o puntos de sutura en la gee vaginal, cámbiese la toalla sanitaria por lo menos cada 4 horas para prevenir sensibilidad e infección. ¨ Podría sentir cólicos [facility] [employer] primeros días después del [place]. [place] es normal y ocurren a medida que el [place] se reduce para volver a forbes tamaño normal. Aquadale un analgésico (medicamento para el dolor) de venta pepe, arun acetaminofén (Tylenol), ibuprofeno (Advil, Motrin) o naproxeno (Aleve) para los cólicos.  Eug[place] y siga todas las instrucciones de la etiqueta. No tome aspirina porque puede causar Conseco. ¨ No tome dos o más analgésicos al mismo tiempo a menos que el médico se lo haya indicado. Muchos analgésicos contienen acetaminofén, es decir, Tylenol. El exceso de acetaminofén (Tylenol) puede ser dañino. · Puntos de sutura ¨ Si tiene puntos de sutura, se disolverán por sí solos y no será necesario quitarlos. Siga las instrucciones de forbes médico para limpiar la gee de los puntos. ¨ Aplíquese hielo o flako compresa fría en la gee adolorida por entre 10 y 21 minutos cada vez, varias veces al día wilfred los primeros días. Póngase un paño luna entre el hielo y la piel. ¨ [place] gamaliel de asiento en un poco de agua tibia 3 veces al día y después de evacuar el intestino. El agua tibia ayuda con el dolor y la comezón. Si no tiene [place], flako ducha tibia puede ayudar. · Senos llenos ¨ Kira senos se pueden llenar en exceso (congestionar) wilfred los primeros días después del [place]. Para ayudar a que la [employer] y aliviar el dolor, caliéntese los senos en la ducha o usando toallas húmedas y calientes antes de amamantar. ¨ Si no está amamantando, no se coloque calor [employer] toque. Use un sostén ajustado o yoselyn deportivo, y aplíquese hielo hasta que los senos no estén llenos. [place] suele tardar Renaee [place] 2 y 4 lexy. ¨ Aplíquese hielo o flako compresa fría en el seno después de amamantar para reducir la hinchazón y el dolor. Póngase un paño luna entre el hielo y la piel. · Actividad ¨ Siga flako dieta equilibrada. No intente bajar de peso reduciendo [employer]. Continúe tomando kira vitaminas prenatales o tome un multivitamínico. 
¨ Descanse todo lo que pueda. Trate de bety siestas cuando forbes bebé duerma wilfred el día. ¨ Sharifa algo de ejercicio todos los días. Sully no sharifa ejercicio intenso hasta que forbes médico lo apruebe.  
¨ Espere hasta que haya sanado (alrededor de 4 a 6 semanas) para tener relaciones sexuales. Forbes médico le dirá cuándo puede hacerlo. ¨ Hable con forbes médico acerca de opciones de métodos anticonceptivos. Puede quedar embarazada aun antes de que forbes período menstrual regrese. Igualmente, puede quedar [address] · Neva mental 
¨ Es normal sentir algo de tristeza, ansiedad, falta de sueño y cambios en el estado de ánimo después de regresar al hogar. Si se siente malhumorada o desesperanzada wilfred más de unos pocos días, o tiene problemas para hacer las cosas que necesita hacer, hable con forbes médico. 
· Hemorroides y estreñimiento ¨ Maura abundantes líquidos, los suficientes arun para que forbes orina sea de color [place] zoe o transparente arun el agua. Si tiene flako enfermedad renal, cardíaca o hepática y tiene que Hiram's líquidos, hable con forbes médico antes de aumentar forbes consumo. ¨ Coma abundante cantidad de [employer]. Coma un panecillo (\"muffin\") de salvado o cereal de salvado en el desayuno, y trate de comer flako fruta arun refrigerio a media tarde. [address] hemorroides dolorosas y con comezón, colóquese hielo o flako compresa fría en la gee varias veces al día wilfred 10 minutos por sesión. A continuación, póngase flako compresa tibia en la gee por otros 10 a 20 minutos, o siéntese en un baño tibio poco profundo. Cuándo debe pedir ayuda? Llame al 911 en cualquier momento que considere que necesita atención de [place]. Por ejemplo, llame si: 
? · Está pensando en hacerse daño a sí misma, o en hacerle daño a forbes bebé o a otra persona. ? · Tiene dolor repentino e intenso en el abdomen. ? · Se desmayó (perdió el conocimiento). ?Llame a forbes médico ahora mismo o busque atención médica inmediata si: 
? · Tiene sangrado vaginal intenso. ? · Empapa flako toalla sanitaria cada hora, wilfred 2 o más horas. ? · El sangrado vaginal parece intensificarse o sigue siendo bobby vivo 4 días después del parto. ? · Se siente mareada o aturdida, o siente que se va a desmayar. ? · Está vomitando o no puede mantener líquidos en el estómago. ? · Tiene fiebre. ? · Tiene dolor nuevo o más intenso en el abdomen. ? · Elimina tejidos (no solo leonidas). ? · El flujo vaginal huele mal.  
? · Forbes abdomen se siente sensible, o lleno y reena. ? · Kira senos están siempre adoloridos o enrojecidos. ? · Se siente doug, ansiosa o desesperanzada [address] de unos días. ?Preste especial atención a los cambios en forbes eduarda y asegúrese de comunicarse con forbes médico si tiene algún problema. Dónde puede encontrar más información en inglés? Cynda Boast a [email or web address] Sylvia Porter G310 en la búsqueda para aprender más acerca de \"Parto vaginal: Instrucciones de cuidado - [ Vaginal Childbirth: Care Instructions ]. \" 
Revisado: 16 marzo, 2017 Versión del contenido: 11.4 © 5971-4621 Healthwise, Incorporated. Las instrucciones de cuidado fueron adaptadas bajo licencia por Good Help Connections (which disclaims liability or warranty for this information). Si usted tiene [place] afección médica o sobre estas instrucciones, siempre pregunte a forbes profesional de eduarda. Healthwise, Incorporated niega toda garantía o responsabilidad por forbes uso de esta información. AmVac Announcement We are excited to announce that we are making your provider's discharge notes available to you in AmVac. You will see these notes when they are completed and signed by the physician that discharged you from your recent hospital stay. If you have any questions or concerns about any information you see in AmVac, please call the Health Information Department where you were seen or reach out to your Primary Care Provider for more information about your plan of care. Introducing [facility]! Estimado Cuttyhunk : 
Meng por solicitar flako cuenta de MyChart usted.  Nuestros registros indican que usted ya tiene flako cuenta [facility]. Usted puede acceder a forbes cuenta en cualquier momento en [email or web address] Sabía usted que usted puede acceder a forbes hospital y las instrucciones de eugene ER en cualquier momento en MyChart ? También puede revisar [facility] de las pruebas de forbes hospitalización o visita a urgencias . Información Adicional 
 
Si tiene alguna pregunta , por favor visite la sección de preguntas frecuentes del sitio web MyChart en [email or web address] . Recuerde, Kerrie[place]view [place] NO es que se utilizará para las necesidades urgentes. Para emergencias médicas , llame al 911 . Ahora disponible en forbes iPhone y Android ! Introducing Eliu Sarmiento As a [employer] [facility] patient, I wanted to make you aware of our electronic visit tool called Eliu Sarmiento. GaleSangart 24/7 allows you to connect within minutes with a medical provider 24 hours a day, seven days a week via a mobile device or tablet or logging into a secure website from your computer. You can access Eliu Sarmiento from anywhere in the [place]. A virtual visit might be right for you when you have a simple condition and feel like you just dont want to get out of bed, or cant get away from work for an appointment, when your regular [facility] [place] [employer] [facility] provider is not available (evenings, weekends or holidays), or when youre out of town and need minor care. Electronic visits cost only $49 and if the [employer] provider determines a prescription is needed to treat your condition, one can be electronically transmitted to a nearby pharmacy*. Please take a moment to enroll today if you have not already done so. The enrollment process is free and takes just a few minutes. To enroll, please download the [place] [employer] pierre to your tablet or phone, or visit [email or web address] to enroll on your computer. And, as an [address] [place] [address] patient with a [employer] account, the results of your visits will be scanned into your electronic medical record and your primary care provider will be able to view the scanned results. We urge you to continue to see your regular Lizz Merritt provider for your ongoing medical care. And while your primary care provider may not be the one available when you seek a Eliu Chowfin virtual visit, the peace of mind you get from getting a real diagnosis real time can be priceless. For more information on Eliu Chowfin, view our Frequently Asked Questions (FAQs) at [email or web address]. Sincerely, 
 
Sagrario Torres MD 
Chief Medical Officer [facility] Lisa Gallagher *:  certain medications cannot be prescribed via Eliu Geraldofin Providers Seen During Your Hospitalization Provider Specialty Primary office phone Melba Jane MD Obstetrics & Gynecology [phone] Your Primary Care Physician (PCP) Primary Care Physician Office Phone Office Fax NONE ** None ** ** None ** You are allergic to the following No active allergies Recent Documentation Breastfeeding? OB Status Smoking Status Yes Recent pregnancy Never Smoker Emergency Contacts Name Discharge Info Relation Home Work Mobile Jerry Batista  Other Relative [6] [phone] Patient Belongings The following personal items are in your possession at time of discharge: 
  Dental Appliances: None  Visual Aid: None      Home Medications: None   Jewelry: None  Clothing: At bedside    Other Valuables: None  Personal Items Sent to Safe: none Please provide this summary of care documentation to your next provider. Signatures-by signing, you are acknowledging that this After Visit Summary has been reviewed with you and you have received a copy.   
  
 
  
    
    
 Patient Signature: ____________________________________________________________ Date:  ____________________________________________________________  
  
[place] Provider Signature:  ____________________________________________________________ Date:  ____________________________________________________________  
  
  
   
  
[address] [place] [address] [facility] 
[phone] Patient: [facility] MRN: [id number] YPB: Sobre forbes hospitalización Le admitieron el:  2018 Forbes tratamiento más reciente fue el:  SFE 4 MOTHER INFANT Le dieron de eugene el:  2018 Por qué le ingresaron Forbes diagnosis primaria es:   (Vaginal Birth After ) Forbes diagnosis también incluye:  39 Weeks Gestation Of Pregnancy, Sprom (Prolonged Spontaneous Rupture Of Membranes), Previous  Section Complicating Pregnancy Follow-up Information Follow up With Details Comments Contact Info Milo Sunshine MD On 2018 at 11:30 am [address] [place] [address]  [address] 
[phone] Your Scheduled Appointments  11:30 AM EDT Global Post Op with Milo Sunshine MD  
[facility]) [address]  [address]  
[phone] Discharge Orders [employer] A check lynne indicates which time of day the medication should be taken. My Medications EMPIECE a Kerlink Instructions Each Dose to Equal  
 Morning Noon Evening Bedtime  
 ibuprofen 800 mg tablet También conocido arun:  MOTRIN Your last dose was: Your next dose is: Take 1 Tab by mouth every six (6) hours as needed. 800 mg SIGA tomando estos medicamentos Instructions Each Dose to Equal  
 Morning Noon Evening Bedtime  
 ferrous sulfate 325 mg (65 mg iron) EC tablet También conocido arun:  IRON  
   
 Your last dose was: Your next dose is: Take 1 Tab by mouth two (2) times a day. 325 mg  
    
   
   
   
  
 PNV No12-Iron-FA-DSS-OM-3 29 mg iron-1 mg -50 mg Cpkd Your last dose was: Your next dose is: Take  by mouth. Dónde puede recoger genny medicamentos Estos medicamentos fueron enviados a [facility] [address]  [place], [address] AT [facility] [address] [address] Teléfono:  [phone]  
  ibuprofen 800 mg tablet Instrucciones a esther de eugene Vaginal Childbirth: What To Expect At Home Your Recovery: Your body will slowly heal in the next few weeks. It is easy to get too tired and overwhelmed during the first weeks after your baby is born. Changes in your hormones can shift your mood without warning. You may find it hard to meet the extra demands on your energy and time. Take it easy on yourself. Follow-up care is a key part of your treatment and safety. Be sure to make and go to all appointments, and call your doctor if you are having problems. It's also a good idea to know your test results and keep a list of the medicines you take. How can you care for yourself at home? Vaginal bleeding and cramps · After delivery, you will have a bloody discharge from the vagina. This will turn pink within a week and then white or yellow after about 10 days. It may last for 2 to 4 weeks or longer, until the uterus has healed. Use pads instead of tampons until you stop bleeding. · Do not worry if you pass some blood clots, as long as they are smaller than a golf ball. If you have a tear or stitches in your vaginal area, change the pad at least every 4 hours to prevent soreness and infection. · You may have cramps for the first few days after childbirth. These are normal and occur as the uterus shrinks to normal size.  Take an over-the-counter pain medicine, such as acetaminophen (Tylenol), ibuprofen (Advil, Motrin), or naproxen (Aleve), for cramps. Read and follow all instructions on the label. Do not take aspirin, because it can cause more bleeding. Do not take acetaminophen (Tylenol) and other acetaminophen containing medications (i.e. Percocet) at the same time. Breast fullness · Your breasts may overfill (engorge) in the first few days after delivery. To help milk flow and to relieve pain, warm your breasts in the shower or by using warm, moist towels before nursing. · If you are not nursing, do not put warmth on your breasts or touch your breasts. Wear a tight bra or sports bra and use ice until the fullness goes away. This usually takes 2 to 3 days. · Put ice or a cold pack on your breast after nursing to reduce swelling and pain. Put a thin cloth between the ice and your skin. Activity · Eat a balanced diet. Do not try to lose weight by cutting calories. Keep taking your prenatal vitamins, or take a multivitamin. · Get as much rest as you can. Try to take naps when your baby sleeps during the day. · Get some exercise every day. But do not do any heavy exercise until your doctor says it is okay. · Wait until you are healed (about 4 to 6 weeks) before you have sexual intercourse. Your doctor will tell you when it is okay to have sex. · Talk to your doctor about birth control. You can get pregnant even before your period returns. Also, you can get pregnant while you are breast-feeding. Mental Health · Many women get the \"baby blues\" during the first few days after childbirth. You may lose sleep, feel irritable, and cry easily. You may feel happy one minute and sad the next. Hormone changes are one cause of these emotional changes. Also, the demands of a new baby, along with visits from relatives or other family needs, add to a mother's stress.  The \"baby blues\" often peak around the fourth day. Then they ease up in less than 2 weeks. · If your moodiness or anxiety lasts for more than 2 weeks, or if you feel like life is not worth living, you may have postpartum depression. This is different for each mother. Some mothers with serious depression may worry intensely about their infant's well-being. Others may feel distant from their child. Some mothers might even feel that they might harm their baby. A mother may have signs of paranoia, wondering if someone is watching her. · With all the changes in your life, you may not know if you are depressed. Pregnancy sometimes causes changes in how you feel that are similar to the symptoms of depression. · Symptoms of depression include: · Feeling sad or hopeless and losing interest in daily activities. These are the most common symptoms of depression. · Sleeping too much or not enough. · Feeling tired. You may feel as if you have no energy. · Eating too much or too little. · POSTPARTUM SUPPORT INTERNATIONAL (PSI) offers a Warm line; Chat with the Expert phone sessions; Information and Articles about Pregnancy and Postpartum Mood Disorders; Comprehensive List of Free Support Groups; Knowledgeable local coordinators who will offer support, information, and resources; Guide to Resources on BTI Payments; Calendar of events in the  mood disorders community; Latest News and Research; and [facility] [address] for [employer]. Remember - You are not alone; You are not to blame; With help, you will be well. 1-060-635-PPD(0953). WWW. POSTPARTUM. NET · Writing or talking about death, such as writing suicide notes or talking about guns, knives, or pills. Keep the numbers for these national suicide hotlines: 1-708-660-TALK (0-122.716.4168) and 3-672-EVIJHOI (2-841.503.1579). If you or someone you know talks about suicide or feeling hopeless, get help right away. Constipation and Hemorrhoids · Drink plenty of fluids, enough so that your urine is light yellow or clear like water. If you have kidney, heart, or liver disease and have to limit fluids, talk with your doctor before you increase the amount of fluids you drink. · Eat plenty of fiber each day. Have a bran muffin or bran cereal for breakfast, and try eating a piece of fruit for a mid-afternoon snack. · For painful, itchy hemorrhoids, put ice or a cold pack on the area several times a day for 10 minutes at a time. Follow this by putting a warm compress on the area for another 10 to 20 minutes or by sitting in a shallow, warm bath. When should you call for help? Call 911 anytime you think you may need emergency care. For example, call if: 
· You are thinking of hurting yourself, your baby, or anyone else. · You passed out (lost consciousness). · You have symptoms of a blood clot in your lung (called a pulmonary embolism). These may include:   
· Sudden chest pain. · Trouble breathing. · Coughing up blood. Call your doctor now or seek immediate medical care if: 
· You have severe vaginal bleeding. · You are soaking through a pad each hour for 2 or more hours. · Your vaginal bleeding seems to be getting heavier or is still bright red 4 days after delivery. · You are dizzy or lightheaded, or you feel like you may faint. · You are vomiting or cannot keep fluids down. · You have a fever. · You have new or more belly pain. · You pass tissue (not just blood). · Your vaginal discharge smells bad. · Your belly feels tender or full and hard. · Your breasts are continuously painful or red. · You feel sad, anxious, or hopeless for more than a few days. · You have sudden, severe pain in your belly. · You have symptoms of a blood clot in your leg (called a deep vein thrombosis),  
       such as: 
· Pain in your calf, back of the knee, thigh, or groin. · Redness and swelling in your leg or groin. · You have symptoms of preeclampsia, such as: 
· Sudden swelling of your face, hands, or feet. · New vision problems (such as dimness or blurring). · A severe headache. · Your blood pressure is higher than it should be or rises suddenly. · You have new nausea or vomiting. Watch closely for changes in your health, and be sure to contact your doctor if you have any problems. Parto vaginal: Instrucciones de cuidado - [ Vaginal Childbirth: Care Instructions ] Instrucciones de cuidado Forbes cuerpo sanará poco a poco wilfred las [place]. Wilfred las primeras semanas después del nacimiento de forbes bebé, es fácil cansarse mucho o sentirse [place]. Los [employer] hormonas pueden afectar forbes estado de ánimo sin previo aviso. Podría descubrir que es difícil satisfacer las exigencias adicionales de energía y [place]. No sharifa demasiados esfuerzos. La atención de seguimiento es flako parte clave de tu tratamiento y seguridad. Asegúrate de hacer y acudir a todas las citas, y llama a tu médico si estás teniendo problemas. También es flako buena idea saber los resultados de los exámenes y mantener flako lista de los medicamentos que giuliano. Cómo puede cuidarse en el hogar? · Sangrado vaginal y cólicos (retortijones) ¨ Después del parto, tendrá flujo sanguinolento (con leonidas) de la vagina. Yin adquirirá un color rosáceo en el plazo de flako semana y luego blancuzco o amarillento después de 10 lexy. El flujo podría durar [place] 2 y 3 [address] o más, hasta que el útero haya sanado. Use toallas sanitarias en vez de tampones hasta que deje de sangrar. ¨ No se preocupe si elimina algunos coágulos de [place], siempre y cuando nancy de tamaño inferior al de flako pelota de golf. Si tiene un desgarro o puntos de sutura en la gee vaginal, cámbiese la toalla sanitaria por lo menos cada 4 horas para prevenir sensibilidad e infección. ¨ Podría sentir [facility] [employer] primeros días después del [place].  [place] es normal y ocurren a medida que el [place] se reduce para volver a forbes tamaño normal. [place] un analgésico (medicamento para el dolor) de venta pepe, arun acetaminofén (Tylenol), ibuprofeno (Advil, Motrin) o naproxeno (Aleve) para los cólicos. Eugenia y siga todas las instrucciones de la [place]. No tome aspirina porque puede causar [employer]. ¨ No tome dos o más analgésicos al mismo tiempo a menos que el médico se lo haya indicado. Muchos analgésicos contienen acetaminofén, es decir, Tylenol. El exceso de acetaminofén (Tylenol) puede ser dañino. · Puntos de sutura ¨ Si tiene puntos de sutura, se disolverán por sí solos y no será necesario quitarlos. Siga las instrucciones de forbes médico para limpiar la gee de los puntos. ¨ Aplíquese hielo o flako compresa fría en la gee adolorida por entre 10 y 21 minutos cada vez, varias veces al día wilfred los primeros días. Póngase un paño luna entre el hielo y la piel. ¨ [place] gamaliel de asiento en un poco de agua tibia 3 veces al día y después de evacuar el intestino. El agua tibia ayuda con el dolor y la comezón. Si no tiene [place], flako ducha tibia puede ayudar. · Senos llenos ¨ Genny senos se pueden llenar en exceso (congestionar) wilfred los primeros días después del [place]. Para ayudar a que la [employer] y aliviar el dolor, caliéntese los senos en la ducha o usando toallas húmedas y calientes antes de amamantar. ¨ Si no está amamantando, no se coloque calor [employer] toque. Use un sostén ajustado o yoselyn deportivo, y aplíquese hielo hasta que los senos no estén llenos. [place] suele tardar [employer] 2 y 4 días. ¨ Aplíquese hielo o flako compresa fría en el seno después de amamantar para reducir la hinchazón y el dolor. Póngase un paño luna entre el hielo y la piel. · Actividad ¨ Siga flako dieta equilibrada. No intente bajar de peso reduciendo [employer].  Continúe tomando genny vitaminas prenatales o tome un multivitamínico. 
 ¨ Descanse todo lo que pueda. Trate de bety siestas cuando forbes bebé duerma wilfred el día. ¨ Sharifa algo de ejercicio todos los días. [place] no sharifa ejercicio intenso hasta que forbes médico lo apruebe. ¨ Espere hasta que haya sanado (alrededor de 4 a 6 semanas) para tener relaciones sexuales. Forbes médico le dirá cuándo puede hacerlo. ¨ Hable con forbes médico acerca de opciones de métodos anticonceptivos. Puede quedar embarazada aun antes de que forbes período menstrual regrese. Igualmente, puede quedar [address] · Neva mental 
¨ Es normal sentir algo de tristeza, ansiedad, falta de sueño y cambios en el estado de ánimo después de regresar al hogar. Si se siente malhumorada o desesperanzada wilfred más de unos pocos días, o tiene problemas para hacer las cosas que necesita hacer, hable con forbes médico. 
· Hemorroides y estreñimiento ¨ Maura abundantes líquidos, los suficientes arun para que forbes orina sea de color amarillo zoe o transparente arun el agua. Si tiene flako enfermedad renal, cardíaca o hepática y tiene que [place]'s líquidos, hable con forbes médico antes de aumentar forbes consumo. ¨ Coma abundante cantidad de [employer]. Coma un panecillo (\"muffin\") de salvado o cereal de salvado en el desayuno, y trate de comer flako fruta arun refrigerio a media tarde. [address] hemorroides dolorosas y con comezón, colóquese hielo o flako compresa fría en la gee varias veces al día wilfred 10 minutos por sesión. A continuación, póngase flako compresa tibia en la gee por otros 10 a 20 minutos, o siéntese en un baño tibio poco profundo. Cuándo debe pedir ayuda? Llame al 911 en cualquier momento que considere que necesita atención de [place]. Por ejemplo, llame si: 
? · Está pensando en hacerse daño a sí misma, o en hacerle daño a forbes bebé o a otra persona. ? · Tiene dolor repentino e intenso en el abdomen. ? · Se desmayó (perdió el conocimiento). ?Llame a almanza médico ahora mismo o busque atención médica inmediata si: 
? · Tiene sangrado vaginal intenso. ? · Empapa flako toalla sanitaria cada hora, wilfred 2 o más horas. ? · El sangrado vaginal parece intensificarse o sigue siendo bobby vivo 4 días después del parto. ? · Se siente mareada o aturdida, o siente que se va a desmayar. ? · Está vomitando o no puede mantener líquidos en el estómago. ? · Tiene fiebre. ? · Tiene dolor nuevo o más intenso en el abdomen. ? · Elimina tejidos (no solo leonidas). ? · El flujo vaginal huele mal.  
? · Almanza abdomen se siente sensible, o lleno y reena. ? · Kira senos están siempre adoloridos o enrojecidos. ? · Se siente doug, ansiosa o desesperanzada [address] de unos días. ?Preste especial atención a los cambios en almanza eduarda y asegúrese de comunicarse con almanza médico si tiene algún problema. Dónde puede encontrar más información en inglés? Gayatri Augustecon a [email or web address] Danna Bradley U452 en la búsqueda para aprender más acerca de \"Parto vaginal: Instrucciones de cuidado - [ Vaginal Childbirth: Care Instructions ]. \" 
Revisado: 16 marzo, 2017 Versión del contenido: 11.4 © 6115-6248 Healthwise, Incorporated. Las instrucciones de cuidado fueron adaptadas bajo licencia por [employer]ctions (which disclaims liability or warranty for this information). Si usted tiene [place] afección médica o sobre estas instrucciones, siempre pregunte a almanza profesional de eduarda. Healthwise, Incorporated niega toda garantía o responsabilidad por almanza uso de esta información. MyChart Announcement We are excited to announce that we are making your provider's discharge notes available to you in UnLtdWorldhart. You will see these notes when they are completed and signed by the physician that discharged you from your recent hospital stay.   If you have any questions or concerns about any information you see in MyChart, please call the Health Information Department where you were seen or reach out to your Primary Care Provider for more information about your plan of care. Introducing [facility]! Estimado Robertson : 
Meng por solicitar flako cuenta de MyChart usbhanu. Nuestros registros indican que usted ya tiene flako cuenta MyChart [place]. Usted puede acceder a forbes cuenta en cualquier momento en [email or web address] Sabía usted que usted puede acceder a forbes hospital y las instrucciones de eugene ER en cualquier momento en MyChart ? También puede revisar [facility] de las pruebas de forbes hospitalización o visita a urgencias . Información Adicional 
 
Si tiene alguna pregunta , por favor visite la sección de preguntas frecuentes del sitio web MyChart en [email or web address] . Recuerde, MyChart NO es que se utilizará para las necesidades urgentes. Para emergencias médicas , llame al 911 . Ahora disponible en forbes iPhone y Android ! Introducing Eliu Sarmiento As a [facility] patient, I wanted to make you aware of our electronic visit tool called Eliu Ervinsharlaryan. [facility] allows you to connect within minutes with a medical provider 24 hours a day, seven days a week via a mobile device or tablet or logging into a secure website from your computer. You can access Eliu Sarmiento from anywhere in the [place]. A virtual visit might be right for you when you have a simple condition and feel like you just dont want to get out of bed, or cant get away from work for an appointment, when your regular [facility] provider is not available (evenings, weekends or holidays), or when youre out of town and need minor care. Electronic visits cost only $49 and if the [facility] 24/7 provider determines a prescription is needed to treat your condition, one can be electronically transmitted to a nearby pharmacy*. Please take a moment to enroll today if you have not already done so. The enrollment process is free and takes just a few minutes. To enroll, please download the [place] 24/7 pierre to your tablet or phone, or visit [email or web address] to enroll on your computer. And, as an [address] [place] [address] patient with a "[employer] account, the results of your visits will be scanned into your electronic medical record and your primary care provider will be able to view the scanned results. We urge you to continue to see your regular [facility] provider for your ongoing medical care. And while your primary care provider may not be the one available when you seek a [facility] virtual visit, the peace of mind you get from getting a real diagnosis real time can be priceless. For more information on [facility], view our Frequently Asked Questions (FAQs) at [email or web address]. Sincerely, 
 
Jean Claude Pastor MD 
Chief Medical Officer [employer] *:  certain medications cannot be prescribed via [facility] Providers Seen During Your Hospitalization Personal Médico [facility] Teléfono principal de la oficina Jessica Tamez MD Obstetrics & Gynecology [phone] Almanza médico de atención primaria (PCP ) Primary Care Physician Office Phone Office Fax NONE ** None ** ** None ** Tiene alergias a lo siguiente No tiene alergias Documentación recientes Está amamantando? Estado obstétrico Estatus de tabaquísmo Yes Recent pregnancy Never Smoker Emergency Contacts Name Discharge Info Relation Home Work Mobile Jerry Batista  Other Relative [6] [phone] Patient Belongings The following personal items are in your possession at time of discharge: 
  Dental Appliances: None  Visual Aid: None      Home Medications: None   Jewelry: None  Clothing:  At bedside    Other Valuables: None  Personal Items Sent to Safe: none Please provide this summary of care documentation to your next provider. Signatures-by signing, you are acknowledging that this After Visit Summary has been reviewed with you and you have received a copy. Patient Signature:  ____________________________________________________________ Date:  ____________________________________________________________  
  
Fayrene Retort Provider Signature:  ____________________________________________________________ Date:  ____________________________________________________________

## 2018-06-05 NOTE — PROGRESS NOTES
Dr Nani Langston notified that no amniotic fluid observed by this RN.    To request Dr Elle García to check pt and possibly AROM

## 2018-06-05 NOTE — PROGRESS NOTES
Patient c/o of anterior headache pain 8/10. Orders received from Dr. Anshul Hurtado for Tylenol 1000mg once.

## 2018-06-05 NOTE — PROGRESS NOTES
06/05/18 1330   Cervical Exam   Dilation (cm) 4   Eff 60 %   Station -3   Vaginal exam done by? Dr Donato Dixon Status AROM   Pt tolerated well.

## 2018-06-05 NOTE — PROGRESS NOTES
Labor Progress Note  Patient seen, fetal heart rate and contraction pattern evaluated, patient examined. Patient Vitals for the past 1 hrs:   BP Temp Pulse Resp   06/05/18 1911 128/76 - 78 -   06/05/18 1910 127/82 - 83 -   06/05/18 1909 126/78 - 89 -   06/05/18 1908 122/89 - 75 -   06/05/18 1852 139/73 - 87 -   06/05/18 1829 163/81 98.5 °F (36.9 °C) 83 18   06/05/18 1823 168/82 - 83 -   06/05/18 1818 (!) 181/105 - 97 -       Physical Exam:  Cervical Exam:  8 cm dilated ; cervix is getting edematous  Uterine Activity: Frequency: Every 2-3 minutes and Intensity: moderate    Assessment/Plan:  No change in cervical dilatation or station of the presenting part since about 1600 today. Epidural catheter replaced by Dr Alexey Augustin, Anesthesia. Will allow patient to get more comfortable to see if it allows the baby to come down further.

## 2018-06-05 NOTE — PROGRESS NOTES
06/05/18 1528   Pain 1   Pain Scale 1 Numeric (0 - 10)   Pain Intensity 1 8   Patient Stated Pain Goal 6   Pain Reassessment 1 Yes   Pain Onset 1 with contractions   Pain Location 1 Abdomen   Pain Orientation 1 Lower   Pain Description 1 Cramping   CRNA aware

## 2018-06-05 NOTE — PROGRESS NOTES
Dr Ivelisse Sanchez at bedside , [facility] reviewed and plan of care discussed.    IVB started for epidural placement

## 2018-06-05 NOTE — PROGRESS NOTES
called patient to be here tomorrow at 6:30AM and gave Instructions and directions requested by Tuyet Zarco

## 2018-06-05 NOTE — PROGRESS NOTES
Labor Progress Note  Patient seen, fetal heart rate and contraction pattern evaluated, patient examined.   Patient Vitals for the past 1 hrs:   BP Pulse   06/05/18 1643 136/72 72   06/05/18 1629 135/67 71       Physical Exam:  Cervical Exam:  8 cm dilated    90% effaced    -3 station      Uterine Activity: Frequency: Every 2-4 minutes and Intensity: moderate    Assessment/Plan:  Reassuring fetal status, Continue plan for vaginal delivery

## 2018-06-05 NOTE — PROGRESS NOTES
Dipika Villagomez at bedside at 1003. Timeout completed at 200 with MD,  and myself at bedside. Test dose given at 1014. Negative reaction. Dose given at 1019. Pt assisted to lying back in left  tilt position. See anesthesia record for details. See vital sign flow sheet for BP. Tolerated procedure well.

## 2018-06-05 NOTE — PROGRESS NOTES
Pt presented for scheduled induction. POC reviewed. IV started, Consents witnessed. Lab work drawn, sent to lab.  present. Patient states feels like water broke while going to the bathroom. Nitrazine positive, SROM at 0645, moderate amount of clear fluid noted.

## 2018-06-05 NOTE — PROGRESS NOTES
Second placement of epidural. Trice Atkinson at bedside at [facility]. Assisted pt to sitting up on bedside at 1856. Timeout completed at [address] [facility] with MD, and myself at bedside. Test dose given at 1904. Negative reaction. Pt assisted to lying back in left tilt position. See anesthesia record for details. See vital sign flow sheet for BP. Tolerated procedure well.

## 2018-06-05 NOTE — PROGRESS NOTES
06/05/18 1207   Cervical Exam   Dilation (cm) 3   Eff 50 %   Station -3   Position Posterior   Vaginal exam done byRommel Viramontes RN    Presenting part ballotable. Pt repositioned to right tilt.

## 2018-06-06 PROBLEM — O34.219 VBAC (VAGINAL BIRTH AFTER CESAREAN): Status: ACTIVE | Noted: 2018-06-05

## 2018-06-06 PROBLEM — O34.219 PREVIOUS CESAREAN SECTION COMPLICATING PREGNANCY: Status: ACTIVE | Noted: 2018-06-06

## 2018-06-06 PROCEDURE — 77030018846 HC SOL IRR STRL H20 ICUM -A

## 2018-06-06 PROCEDURE — 75410000000 HC DELIVERY VAGINAL/SINGLE

## 2018-06-06 PROCEDURE — 76060000078 HC EPIDURAL ANESTHESIA

## 2018-06-06 PROCEDURE — 65270000029 HC RM PRIVATE

## 2018-06-06 PROCEDURE — 75410000002 HC LABOR FEE PER 1 HR: Performed by: OBSTETRICS & GYNECOLOGY

## 2018-06-06 PROCEDURE — 74011250637 HC RX REV CODE- 250/637: Performed by: OBSTETRICS & GYNECOLOGY

## 2018-06-06 PROCEDURE — 75410000003 HC RECOV DEL/VAG/CSECN EA 0.5 HR: Performed by: OBSTETRICS & GYNECOLOGY

## 2018-06-06 RX ORDER — IBUPROFEN 800 MG/1
800 TABLET ORAL
Qty: 30 TAB | Refills: 1 | Status: SHIPPED | OUTPATIENT
Start: 2018-06-06 | End: 2018-07-24 | Stop reason: CLARIF

## 2018-06-06 RX ADMIN — METHYLERGONOVINE MALEATE 200 MCG: 0.2 TABLET ORAL at 01:30

## 2018-06-06 RX ADMIN — DOCUSATE SODIUM 100 MG: 100 CAPSULE, LIQUID FILLED ORAL at 09:19

## 2018-06-06 RX ADMIN — METHYLERGONOVINE MALEATE 200 MCG: 0.2 TABLET ORAL at 06:29

## 2018-06-06 RX ADMIN — FERROUS SULFATE TAB 325 MG (65 MG ELEMENTAL FE) 325 MG: 325 (65 FE) TAB at 15:47

## 2018-06-06 RX ADMIN — DOCUSATE SODIUM 100 MG: 100 CAPSULE, LIQUID FILLED ORAL at 18:05

## 2018-06-06 RX ADMIN — METHYLERGONOVINE MALEATE 200 MCG: 0.2 TABLET ORAL at 11:00

## 2018-06-06 RX ADMIN — IBUPROFEN 800 MG: 800 TABLET ORAL at 09:19

## 2018-06-06 RX ADMIN — IBUPROFEN 800 MG: 800 TABLET ORAL at 15:47

## 2018-06-06 RX ADMIN — WITCH HAZEL 1 PAD: 500 SOLUTION RECTAL; TOPICAL at 09:21

## 2018-06-06 RX ADMIN — FERROUS SULFATE TAB 325 MG (65 MG ELEMENTAL FE) 325 MG: 325 (65 FE) TAB at 09:19

## 2018-06-06 RX ADMIN — FERROUS SULFATE TAB 325 MG (65 MG ELEMENTAL FE) 325 MG: 325 (65 FE) TAB at 18:05

## 2018-06-06 NOTE — PROGRESS NOTES
Post-Partum Day Number 1 Progress Note    Patient doing well post-partum without significant complaint. Voiding withour difficulty, normal lochia. Vitals:  Patient Vitals for the past 8 hrs:   BP Temp Pulse Resp   18 1050 114/64 98.3 °F (36.8 °C) 77 18   18 0733 110/71 98.4 °F (36.9 °C) 65 16     Temp (24hrs), Av.6 °F (37 °C), Min:98 °F (36.7 °C), Max:99.8 °F (37.7 °C)      Vital signs stable, afebrile. Exam:  Patient without distress. Abdomen soft, fundus firm at level of umbilicus, nontender               Perineum with normal lochia noted. Lower extremities are negative for swelling, cords or tenderness. Assessment and Plan:  Patient appears to be having uncomplicated post-partum course. Continue routine perineal care and maternal education. Plan discharge tomorrow if no problems occur.

## 2018-06-06 NOTE — L&D DELIVERY NOTE
Delivery Summary    Patient: Nathalie Pink MRN: [id number]  SSN: [id number]    YOB: 1980  Age: 40 y.o. Sex: female       Information for the patient's :  Connor Hernandez [[id number]]       Labor Events:    Labor: No   Rupture Date: 2018   Rupture Time: 6:45 AM   Rupture Type SROM   Amniotic Fluid Volume: Moderate    Amniotic Fluid Description: Clear None   Induction: Oxytocin       Augmentation: None   Labor Events: Other (comment); Dysfunctional Labor     Cervical Ripening:     None     Delivery Events:  Episiotomy: None   Laceration(s):       Repaired: None    Number of Repair Packets:     Suture Type and Size: None     Estimated Blood Loss (ml): 400ml       Delivery Date: 2018    Delivery Time: 9:27 PM  Delivery Type: , Spontaneous  Sex:  Female     Gestational Age: 36w3d   Delivery Clinician:  Tierra Davis  Living Status: Living   Delivery Location: L&D            APGARS  One minute Five minutes Ten minutes   Skin color: 1   1        Heart rate: 2   2        Grimace: 2   2        Muscle tone: 1   2        Breathin   2        Totals: 8   9            Presentation: Vertex    Position: Right Occiput Anterior  Resuscitation Method:  Tactile Stimulation     Meconium Stained: None      Cord Vessels: 3 Vessels      Cord Events:    Cord Blood Sent?:  Yes    Blood Gases Sent?:  Yes    Placenta:  Date/Time:   9:31 PM  Removal: Spontaneous      Appearance: Normal;Intact      Measurements:  Birth Weight: 8 lb 14.9 oz (4.05 kg)      Birth Length: 53 cm      Head Circumference: 36.5 cm      Chest Circumference: 37 cm     Abdominal Girth: Other Providers:   TIERRA DAVIS;MARIA ESTHER DANIELLE;MANUEL PICKETT;NEFTALI LIZARRAGA;LALITO MONTE Jonel Sloan, Obstetrician;Primary Nurse;Primary  Nurse;Charge Nurse;Scrub Tech; Anesthesiologist           Group B Strep:   Lab Results   Component Value Date/Time    GrBStrep, External Negative 2018     Information for the patient's :  Cintia Sanchez [[id number]]     Lab Results   Component Value Date/Time    ABO/Rh(D) O POSITIVE 2018 09:27 PM    LEIGH ANN IgG NEG 2018 09:27 PM     Lab Results   Component Value Date/Time    APH 7.260 2018 09:27 PM    APCO2 62 (H) 2018 09:27 PM    APO2 20 (H) 2018 09:27 PM    AHCO3 27 (H) 2018 09:27 PM    ABDC 1.4 2018 09:27 PM    EPHV 7.412 2018 09:27 PM    PCO2V 34 2018 09:27 PM    PO2V 50 2018 09:27 PM    HCO3V 21 2018 09:27 PM    EBDV 2.7 2018 09:27 PM    SITE CORD 2018 09:27 PM    SITE CORD 2018 09:27 PM    RSCOM N A at . Not read back. 2018 09:27 PM    RSCOM N A at . Not read back.  2018 09:27 PM

## 2018-06-06 NOTE — PROGRESS NOTES
Labor Progress Note  Patient seen, fetal heart rate and contraction pattern evaluated, patient examined.   Patient Vitals for the past 1 hrs:   BP Pulse   06/05/18 2046 132/67 72   06/05/18 2030 147/83 68       Physical Exam:  Cervical Exam:  10 cm dilated    100% effaced    +2 station      Uterine Activity: Frequency: Every 2-3 minutes and Intensity: strong    Assessment/Plan:  Reassuring fetal status, Continue plan for vaginal delivery

## 2018-06-06 NOTE — PROGRESS NOTES
SBAR OUT Report: Mother    Verbal report given to REGULO Garcia RN on this patient, who is now being transferred to MIU for routine progression of care. The patient is not wearing a green \"Anesthesia-Duramorph\" band. Report consisted of patient's Situation, Background, Assessment and Recommendations (SBAR).  ID bands were compared with the identification form, and verified with the patient and receiving nurse. Information from the SBAR, Kardex, Intake/Output and MAR and the [place] Report was reviewed with the receiving nurse; opportunity for questions and clarification provided.

## 2018-06-06 NOTE — PROGRESS NOTES
Fundal massage boogy, +1 U and to the right. I&O cath performed. 700 light pink-tinged urine noted in collection back. Fundal massage after I&O, firm at U and midline. Patient c/o of pain 9/10 during fundal massage. Will administer pain medication.

## 2018-06-06 NOTE — PROGRESS NOTES
present during Physician Mague Barone suggested patient should walk to avoid blood clots in legs and lungs.   Patient is going home tomorrow

## 2018-06-06 NOTE — PROGRESS NOTES
Updated Dr. Marcelino Funes on recent SVE: 10/100/-1. Patient placed on left side with peanut in place to labor down. Patient c/o of itching. Orders to give benadryl 50mg orally verified by Dr. Marcelino Funes as well as Dr. Jus Sabillon.

## 2018-06-06 NOTE — PROGRESS NOTES
Patient up to BR with assistance to void 400cc's lori care given. Disposable panties and lori pad changed. Patient back to bed with assistance. Hip pad changed patient moved up in bed, given infant to nurse, patient tolerated activity well. No complaints voiced or any needs at this time.

## 2018-06-06 NOTE — PROGRESS NOTES
Patient laying in bed with eyes closed no complaints at this time , spouse laying on couch in bed in room.

## 2018-06-06 NOTE — PROGRESS NOTES
Patient c/o of feeling dizzly, light-headed. Fundus firm with massage, at U. Trickle of blood noted upon massage followed by robby-sized clot. Pad changed.  /6

## 2018-06-06 NOTE — PROGRESS NOTES
Attended delivery as patient nurse. Viable babygirl born @2127*. Apgars 8&9. Baby is LGA according to [facility] Growth Chart. Completed admission assessment, footprints, and measurements. ID bands verified and and placed on infant. Mother plans to breast/bottle feed.

## 2018-06-06 NOTE — ANESTHESIA POSTPROCEDURE EVALUATION
Post-Anesthesia Evaluation and Assessment    Patient: Annamarie Swift MRN: [id number]  SSN: [id number]    YOB: 1980  Age: 40 y.o. Sex: female       Cardiovascular Function/Vital Signs  Visit Vitals    /64    Pulse 80    Temp 37.7 °C (99.8 °F)    Resp 18    Breastfeeding Yes       Patient is status post epidural anesthesia for * No procedures listed *. Nausea/Vomiting: None    Postoperative hydration reviewed and adequate. Pain:  Pain Scale 1: Numeric (0 - 10) (06/05/18 2206)  Pain Intensity 1: 0 (06/05/18 2206)   Managed    Neurological Status:   Neuro (WDL): Within Defined Limits (06/05/18 2206)   Block resolving    Mental Status and Level of Consciousness: Alert and oriented     Pulmonary Status:   O2 Device: Room air (06/05/18 2206)   Adequate oxygenation and airway patent    Complications related to anesthesia: None    Post-anesthesia assessment completed.  No concerns    Signed By: Marcel Hughes MD     June 5, 2018

## 2018-06-06 NOTE — PROGRESS NOTES
SBAR IN Report: Mother    Verbal report received from BERTHA Almanzar RN  on this patient, who is now being transferred from L&D (unit) for routine progression of care. The patient is not wearing a green \"Anesthesia-Duramorph\" band. Report consisted of patient's Situation, Background, Assessment and Recommendations (SBAR).  ID bands were compared with the identification form, and verified with the patient and transferring nurse. Information from the SBAR, Procedure Summary, Intake/Output and MAR and the [place] Report was reviewed with the transferring nurse; opportunity for questions and clarification provided.

## 2018-06-06 NOTE — PROGRESS NOTES
Updated Dr. Christian Ceron with bleeding status. Moderate amount of bleeding noted, with nickel-sized clots. Fundus firm with massage at U. Pad changed and saved at bedside. Patient c/o of feeling tired, light-headed. BP's stable at this time. New orders received to begin pitocin at 250ml. Verbalized understanding.

## 2018-06-06 NOTE — PROGRESS NOTES
Dr. Annalisa Baum at bedside. SVE performed, ready for delivery. Notified staff of delivery to room 434. Request table set up.

## 2018-06-07 VITALS
OXYGEN SATURATION: 97 % | SYSTOLIC BLOOD PRESSURE: 118 MMHG | HEART RATE: 71 BPM | DIASTOLIC BLOOD PRESSURE: 67 MMHG | TEMPERATURE: 98 F | RESPIRATION RATE: 16 BRPM

## 2018-06-07 PROCEDURE — 74011250637 HC RX REV CODE- 250/637: Performed by: OBSTETRICS & GYNECOLOGY

## 2018-06-07 RX ADMIN — DOCUSATE SODIUM 100 MG: 100 CAPSULE, LIQUID FILLED ORAL at 08:11

## 2018-06-07 RX ADMIN — PRENATAL VIT W/ FE FUMARATE-FA TAB 27-0.8 MG 1 TABLET: 27-0.8 TAB at 08:12

## 2018-06-07 RX ADMIN — FERROUS SULFATE TAB 325 MG (65 MG ELEMENTAL FE) 325 MG: 325 (65 FE) TAB at 08:12

## 2018-06-07 NOTE — PROGRESS NOTES
Discharge teaching provided for patient in [address] [facility]. Reviewed discharge teaching with Marry Wiley,  present. Pt denies questions at this time. Encouraged to call for needs or concerns.

## 2018-06-07 NOTE — PROGRESS NOTES
Discharged in stable condition to home after ID bands verified and 's code alert removed. Discharge teaching complete, patient verbalizes understanding; questions encouraged. Transported with infant in arms via wheelchair to private vehicle. Infant placed in rear facing car seat.

## 2018-06-07 NOTE — PROGRESS NOTES
Shift report received from Candice Murray RN care assumed. Many visitors here, denies needs at this time.

## 2018-06-07 NOTE — PROGRESS NOTES
Chart reviewed - history of postpartum depression.  made introduction to family and provided Vietnamese brochure for [employer].  Patient denies any history of postpartum depression. Family receptive to receiving information and denied any additional needs from .     Nitin Medina, [address] [facility]

## 2018-06-07 NOTE — DISCHARGE SUMMARY
Obstetrical Discharge Summary     Name: Юлия Sosa MRN: [id number]  SSN: [id number]    YOB: 1980  Age: 40 y.o. Sex: female      Allergies: Review of patient's allergies indicates no known allergies. Admit Date: 2018    Discharge Date: 2018     Admitting Physician: Shedrick Meigs, MD     Attending Physician:  Shedrick Meigs, MD     * Admission Diagnoses: Previous CS; 39 weeks gestation of pregnancy    * Discharge Diagnoses:   Information for the patient's :  Earl Maravilla [[id number]]   Delivery of a 8 lb 14.9 oz (4.05 kg) female infant via [address], Spontaneous on 2018 at 9:27 PM  by . Apgars were 8 and 9.        Additional Diagnoses:   Hospital Problems as of 2018  Date Reviewed: 2018          Codes Class Noted - Resolved POA    Previous  section complicating pregnancy H-21-WJ: O34.219  ICD-9-CM: 654.20  2018 - Present Yes        * (Principal) (vaginal birth after ) ICD-10-CM: O34.219  ICD-9-CM: 654.21  2018 - Present No        RESOLVED: SPROM (prolonged spontaneous rupture of membranes) ICD-10-CM: O42.90  ICD-9-CM: 658.20  2018 - 2018 Yes        RESOLVED: 39 weeks gestation of pregnancy ICD-10-CM: Z3A.39  ICD-9-CM: V22.2  2016 - 2018 Yes             Lab Results   Component Value Date/Time    ABO/Rh(D) O POSITIVE 2018 07:19 AM    Rubella, External Immune (2.25) 2017    GrBStrep, External Negative 2018    ABO,Rh O Positive 2017      Immunization History   Administered Date(s) Administered    Tdap 2016       * Procedures:     on 2018    Jelm  Depression Scale  I have been able to laugh and see the funny side of things: As much as I always could  I have looked forward with enjoyment to things: As much as I ever did  I have blamed myself unnecessarily when things went wrong: No, never  I have been anxious or worried for no good reason: No, not at all  I have felt scared or panicky for no very good reason: No, not at all  Things have been getting on top of me: No, I have been coping as well as ever  I have been so unhappy that I have had difficulty sleeping: No, not at all  I have felt sad or miserable: No, not at all  I have been so unhappy that I have been crying: No, never  The thought of harming myself has occurred to me: Never  Total Score: 0    * Discharge Condition: good    * Hospital Course: Normal hospital course following the delivery. * Disposition: Home    Discharge Medications:   Current Discharge Medication List      START taking these medications    Details   ibuprofen (MOTRIN) 800 mg tablet Take 1 Tab by mouth every six (6) hours as needed. Qty: 30 Tab, Refills: 1         CONTINUE these medications which have NOT CHANGED    Details   PNV No12-Iron-FA-DSS-OM-3 29 mg iron-1 mg -50 mg CPKD Take  by mouth. ferrous sulfate (IRON) 325 mg (65 mg iron) EC tablet Take 1 Tab by mouth two (2) times a day. Qty: 60 Tab, Refills: 2    Associated Diagnoses: Anemia complicating pregnancy in third trimester             * Follow-up Care/Patient Instructions:   Activity: Activity as tolerated and No sex for 6 weeks  Diet: Regular Diet      Follow-up Information     Follow up With Details Comments [address], [place] On 7/5/2018 at 11:30 am [address]             Signed By:  Sanjuana Del Rio MD     June 7, 2018

## 2018-06-07 NOTE — PROGRESS NOTES
Shift assessment complete. Pt ambulating around room without difficulty. Encouraged to call for needs or concerns. Verbalized understanding.

## 2018-06-07 NOTE — DISCHARGE INSTRUCTIONS
Vaginal Childbirth: What To Expect At Home    Your Recovery: Your body will slowly heal in the next few weeks. It is easy to get too tired and overwhelmed during the first weeks after your baby is born. Changes in your hormones can shift your mood without warning. You may find it hard to meet the extra demands on your energy and time. Take it easy on yourself. Follow-up care is a key part of your treatment and safety. Be sure to make and go to all appointments, and call your doctor if you are having problems. It's also a good idea to know your test results and keep a list of the medicines you take. How can you care for yourself at home? Vaginal bleeding and cramps  · After delivery, you will have a bloody discharge from the vagina. This will turn pink within a week and then white or yellow after about 10 days. It may last for 2 to 4 weeks or longer, until the uterus has healed. Use pads instead of tampons until you stop bleeding. · Do not worry if you pass some blood clots, as long as they are smaller than a golf ball. If you have a tear or stitches in your vaginal area, change the pad at least every 4 hours to prevent soreness and infection. · You may have cramps for the first few days after childbirth. These are normal and occur as the uterus shrinks to normal size. Take an over-the-counter pain medicine, such as acetaminophen (Tylenol), ibuprofen (Advil, Motrin), or naproxen (Aleve), for cramps. Read and follow all instructions on the label. Do not take aspirin, because it can cause more bleeding. Do not take acetaminophen (Tylenol) and other acetaminophen containing medications (i.e. Percocet) at the same time. Breast fullness  · Your breasts may overfill (engorge) in the first few days after delivery. To help milk flow and to relieve pain, warm your breasts in the shower or by using warm, moist towels before nursing.   · If you are not nursing, do not put warmth on your breasts or touch your breasts. Wear a tight bra or sports bra and use ice until the fullness goes away. This usually takes 2 to 3 days. · Put ice or a cold pack on your breast after nursing to reduce swelling and pain. Put a thin cloth between the ice and your skin. Activity  · Eat a balanced diet. Do not try to lose weight by cutting calories. Keep taking your prenatal vitamins, or take a multivitamin. · Get as much rest as you can. Try to take naps when your baby sleeps during the day. · Get some exercise every day. But do not do any heavy exercise until your doctor says it is okay. · Wait until you are healed (about 4 to 6 weeks) before you have sexual intercourse. Your doctor will tell you when it is okay to have sex. · Talk to your doctor about birth control. You can get pregnant even before your period returns. Also, you can get pregnant while you are breast-feeding. Mental Health  · Many women get the \"baby blues\" during the first few days after childbirth. You may lose sleep, feel irritable, and cry easily. You may feel happy one minute and sad the next. Hormone changes are one cause of these emotional changes. Also, the demands of a new baby, along with visits from relatives or other family needs, add to a mother's stress. The \"baby blues\" often peak around the fourth day. Then they ease up in less than 2 weeks. · If your moodiness or anxiety lasts for more than 2 weeks, or if you feel like life is not worth living, you may have postpartum depression. This is different for each mother. Some mothers with serious depression may worry intensely about their infant's well-being. Others may feel distant from their child. Some mothers might even feel that they might harm their baby. A mother may have signs of paranoia, wondering if someone is watching her. · With all the changes in your life, you may not know if you are depressed.  Pregnancy sometimes causes changes in how you feel that are similar to the symptoms of depression. · Symptoms of depression include:  · Feeling sad or hopeless and losing interest in daily activities. These are the most common symptoms of depression. · Sleeping too much or not enough. · Feeling tired. You may feel as if you have no energy. · Eating too much or too little. · POSTPARTUM SUPPORT INTERNATIONAL (PSI) offers a Warm line; Chat with the Expert phone sessions; Information and Articles about Pregnancy and Postpartum Mood Disorders; Comprehensive List of Free Support Groups; Knowledgeable local coordinators who will offer support, information, and resources; Guide to Resources on [employer]; Calendar of events in the  mood disorders community; Latest News and Research; and [facility] [address] for [employer]. Remember - You are not alone; You are not to blame; With help, you will be well. 4-652-190-PPD(5591). WWW. POSTPARTUM. NET   · Writing or talking about death, such as writing suicide notes or talking about guns, knives, or pills. Keep the numbers for these national suicide hotlines: 7-357-338-TALK (5-283.202.2659) and 0-307-VSNTITN (4-767.388.1056). If you or someone you know talks about suicide or feeling hopeless, get help right away. Constipation and Hemorrhoids  · Drink plenty of fluids, enough so that your urine is light yellow or clear like water. If you have kidney, heart, or liver disease and have to limit fluids, talk with your doctor before you increase the amount of fluids you drink. · Eat plenty of fiber each day. Have a bran muffin or bran cereal for breakfast, and try eating a piece of fruit for a mid-afternoon snack. · For painful, itchy hemorrhoids, put ice or a cold pack on the area several times a day for 10 minutes at a time. Follow this by putting a warm compress on the area for another 10 to 20 minutes or by sitting in a shallow, warm bath. When should you call for help? Call 911 anytime you think you may need emergency care.  For example, call if:  · You are thinking of hurting yourself, your baby, or anyone else. · You passed out (lost consciousness). · You have symptoms of a blood clot in your lung (called a pulmonary embolism). These may include:    · Sudden chest pain. · Trouble breathing. · Coughing up blood. Call your doctor now or seek immediate medical care if:  · You have severe vaginal bleeding. · You are soaking through a pad each hour for 2 or more hours. · Your vaginal bleeding seems to be getting heavier or is still bright red 4 days after delivery. · You are dizzy or lightheaded, or you feel like you may faint. · You are vomiting or cannot keep fluids down. · You have a fever. · You have new or more belly pain. · You pass tissue (not just blood). · Your vaginal discharge smells bad. · Your belly feels tender or full and hard. · Your breasts are continuously painful or red. · You feel sad, anxious, or hopeless for more than a few days. · You have sudden, severe pain in your belly. · You have symptoms of a blood clot in your leg (called a deep vein thrombosis),          such as:  · Pain in your calf, back of the knee, thigh, or groin. · Redness and swelling in your leg or groin. · You have symptoms of preeclampsia, such as:  · Sudden swelling of your face, hands, or feet. · New vision problems (such as dimness or blurring). · A severe headache. · Your blood pressure is higher than it should be or rises suddenly. · You have new nausea or vomiting. Watch closely for changes in your health, and be sure to contact your doctor if you have any problems. Parto vaginal: Instrucciones de cuidado - [ Vaginal Childbirth: Care Instructions ]  Instrucciones de cuidado    Forbes cuerpo sanará poco a poco wilfred las próximas semanas. Wilfred las primeras semanas después del nacimiento de forbes bebé, es fácil cansarse mucho o sentirse [place].  Los [employer] hormonas pueden afectar forbes estado de ánimo sin previo roryso. Marta Ramachandran descubrir que es difícil satisfacer las exigencias adicionales de energía y [place]. No sharifa demasiados esfuerzos. La atención de seguimiento es flako parte clave de tu tratamiento y seguridad. Asegúrate de hacer y acudir a todas las citas, y llama a tu médico si estás teniendo problemas. También es flako buena idea saber los resultados de los exámenes y mantener flako lista de los medicamentos que giuliano. ¿Cómo puede cuidarse en el hogar? · Sangrado vaginal y cólicos (retortijones)  ¨ Después del parto, tendrá flujo sanguinolento (con leonidas) de la vagina. Yin adquirirá un color rosáceo en el plazo de flako semana y luego blancuzco o amarillento después de 10 lexy. El flujo podría durar Arg[place] 2 y [address] [facility] o más, hasta que el útero haya sanado. Use toallas sanitarias en vez de tampones hasta que deje de sangrar. ¨ No se preocupe si elimina algunos coágulos de [place], siempre y cuando nancy de tamaño inferior al de flako pelota de golf. Si tiene un desgarro o puntos de sutura en la gee vaginal, cámbiese la toalla sanitaria por lo menos cada 4 horas para prevenir sensibilidad e infección. ¨ Podría sentir cólicos [facility] [employer] primeros días después del [place]. [place] es normal y ocurren a medida que el [place] se reduce para volver a forbes tamaño normal. [place] un analgésico (medicamento para el dolor) de venta pepe, arun acetaminofén (Tylenol), ibuprofeno (Advil, Motrin) o naproxeno (Aleve) para los cólicos. Eugenia y siga todas las instrucciones de la [place]. No tome aspirina porque puede causar [employer]. ¨ No tome dos o más analgésicos al mismo tiempo a menos que el médico se lo haya indicado. Muchos analgésicos contienen acetaminofén, es decir, Tylenol. El exceso de acetaminofén (Tylenol) puede ser dañino. · Puntos de sutura  ¨ Si tiene puntos de sutura, se disolverán por sí solos y no será necesario quitarlos. Siga las instrucciones de forbes médico para limpiar la gee de los puntos.   ¨ Aplíquese hielo o [place] compresa fría en la gee adolorida por entre 10 y 21 minutos cada vez, varias veces al día wilfred los primeros días. Póngase un paño luna entre el hielo y la piel. ¨ [place] gamaliel de asiento en un poco de agua tibia 3 veces al día y después de evacuar el intestino. El agua tibia ayuda con el dolor y la comezón. Si no tiene [place], flako ducha tibia puede ayudar. · Senos llenos  ¨ Kira senos se pueden llenar en exceso (congestionar) wilfred los primeros días después del [place]. Para ayudar a que la [employer] y aliviar el dolor, caliéntese los senos en la ducha o usando toallas húmedas y calientes antes de amamantar. ¨ Si no está amamantando, no se coloque calor [employer] toque. Use un sostén ajustado o yoselyn deportivo, y aplíquese hielo hasta que los senos no estén llenos. [place] suele tardar [place] 2 y 4 días. ¨ Aplíquese hielo o flako compresa fría en el seno después de amamantar para reducir la hinchazón y el dolor. Póngase un paño luna entre el hielo y la piel. · Actividad  ¨ Siga flako dieta equilibrada. No intente bajar de peso reduciendo [employer]. Continúe tomando kira vitaminas prenatales o tome un multivitamínico.  ¨ Descanse todo lo que pueda. Trate de bety siestas cuando forbes bebé duerma wilfred el día. ¨ Sharifa algo de ejercicio todos los días. [place] no sharifa ejercicio intenso hasta que forbes médico lo apruebe. ¨ Espere hasta que haya sanado (alrededor de 4 a 6 semanas) para tener relaciones sexuales. Forbes médico le dirá cuándo puede hacerlo. ¨ Hable con forbes médico acerca de opciones de métodos anticonceptivos. Puede quedar embarazada aun antes de que forbes período menstrual regrese. Igualmente, puede quedar [address] · Neva mental  ¨ Es normal sentir algo de tristeza, ansiedad, falta de sueño y cambios en el estado de ánimo después de regresar al hogar.  Si se siente malhumorada o desesperanzada wilfred más de unos pocos días, o tiene problemas para [employer] cosas que necesita hacer, hable con forbes médico.  · Hemorroides y estreñimiento  ¨ Maura abundantes líquidos, los suficientes arnu para que forbes orina sea de color amarillo zoe o transparente arun el agua. Si tiene flako enfermedad renal, cardíaca o hepática y tiene que [employer] líquidos, hable con forbes médico antes de aumentar forbes consumo. ¨ Coma abundante cantidad de [facility]. Coma un panecillo (\"muffin\") de salvado o cereal de salvado en el desayuno, y trate de comer flako fruta arun refrigerio a media tarde. [address] hemorroides dolorosas y con comezón, colóquese hielo o flako compresa fría en la gee varias veces al día wilfred 10 minutos por sesión. A continuación, póngase flako compresa tibia en la gee por otros 10 a 20 minutos, o siéntese en un baño tibio poco profundo. ¿Cuándo debe pedir ayuda? Llame al 911 en cualquier momento que considere que necesita atención de [place]. Por ejemplo, llame si:  ? · Está pensando en hacerse daño a sí misma, o en hacerle daño a forbes bebé o a otra persona. ? · Tiene dolor repentino e intenso en el abdomen. ? · Se desmayó (perdió el conocimiento). ?Llame a forbes médico ahora mismo o busque atención médica inmediata si:  ? · Tiene sangrado vaginal intenso. ? · Empapa flako toalla sanitaria cada hora, wilfred 2 o más horas. ? · El sangrado vaginal parece intensificarse o sigue siendo bobby vivo 4 días después del parto. ? · Se siente mareada o aturdida, o siente que se va a desmayar. ? · Está vomitando o no puede mantener líquidos en el estómago. ? · Tiene fiebre. ? · Tiene dolor nuevo o más intenso en el abdomen. ? · Elimina tejidos (no solo leonidas). ? · El flujo vaginal huele mal.   ? · Forbes abdomen se siente sensible, o lleno y reena. ? · Kira senos están siempre adoloridos o enrojecidos. ? · Se siente doug, ansiosa o desesperanzada [address] de unos días.    ?Preste especial atención a los cambios en forbes eduarda y asegúrese de comunicarse con forbes médico si tiene algún problema. ¿Dónde puede encontrar más información en inglés? Kenroy Perry a [email or web address] [address] en la búsqueda para aprender más acerca de \"Parto vaginal: Instrucciones de cuidado - [ Vaginal Childbirth: Care Instructions ]. \"  Revisado: 16 marzo, 2017  Versión del contenido: 11.4  © 6639-0598 Healthwise, Incorporated. Las instrucciones de cuidado fueron adaptadas bajo licencia por [employer] (which disclaims liability or warranty for this information). Si usted tiene [place] afección médica o sobre estas instrucciones, siempre pregunte a forbes profesional de eduarda. Healthwise, Incorporated niega toda garantía o responsabilidad por forbes uso de esta información.

## 2018-07-24 PROBLEM — O34.219 PREVIOUS CESAREAN SECTION COMPLICATING PREGNANCY: Status: RESOLVED | Noted: 2018-06-06 | Resolved: 2018-07-24

## 2018-07-24 PROBLEM — E66.01 SEVERE OBESITY (BMI 35.0-39.9): Status: ACTIVE | Noted: 2018-07-24

## 2022-03-19 PROBLEM — O34.219 VBAC (VAGINAL BIRTH AFTER CESAREAN): Status: ACTIVE | Noted: 2018-06-05

## 2023-07-15 NOTE — PROGRESS NOTES
Dr. Kasey Torres at bedside. SVE: 8/90/-2. Internal Fetal scalp monitor applied and tracing. Will continue to monitor. soft/nondistended

## 2025-02-20 ENCOUNTER — TRANSCRIBE ORDERS (OUTPATIENT)
Dept: SCHEDULING | Age: 45
End: 2025-02-20

## 2025-02-20 DIAGNOSIS — Z12.31 VISIT FOR SCREENING MAMMOGRAM: Primary | ICD-10-CM

## 2025-03-03 ENCOUNTER — HOSPITAL ENCOUNTER (OUTPATIENT)
Dept: MAMMOGRAPHY | Age: 45
Discharge: HOME | End: 2025-03-06

## 2025-03-03 VITALS — BODY MASS INDEX: 40.59 KG/M2 | WEIGHT: 215 LBS | HEIGHT: 61 IN

## 2025-03-03 DIAGNOSIS — Z12.31 VISIT FOR SCREENING MAMMOGRAM: ICD-10-CM

## 2025-03-03 PROCEDURE — 77063 BREAST TOMOSYNTHESIS BI: CPT

## 2025-03-03 PROCEDURE — 77067 SCR MAMMO BI INCL CAD: CPT

## 2025-03-10 ENCOUNTER — RESULTS FOLLOW-UP (OUTPATIENT)
Dept: MAMMOGRAPHY | Age: 45
End: 2025-03-10